# Patient Record
Sex: FEMALE | Race: WHITE | NOT HISPANIC OR LATINO | Employment: FULL TIME | URBAN - METROPOLITAN AREA
[De-identification: names, ages, dates, MRNs, and addresses within clinical notes are randomized per-mention and may not be internally consistent; named-entity substitution may affect disease eponyms.]

---

## 2023-02-15 PROBLEM — Z90.710 S/P ABDOMINAL HYSTERECTOMY AND LEFT SALPINGO-OOPHORECTOMY: Status: ACTIVE | Noted: 2023-02-15

## 2023-02-15 PROBLEM — M16.12 PRIMARY OSTEOARTHRITIS OF LEFT HIP: Status: ACTIVE | Noted: 2023-02-15

## 2023-02-15 PROBLEM — F17.210 CIGARETTE SMOKER: Status: ACTIVE | Noted: 2023-02-15

## 2023-02-15 PROBLEM — M54.50 LOW BACK PAIN: Status: ACTIVE | Noted: 2023-02-15

## 2023-02-15 PROBLEM — N39.0 ACUTE UTI: Status: ACTIVE | Noted: 2023-02-15

## 2023-02-15 PROBLEM — J45.909 ASTHMA (HHS-HCC): Status: ACTIVE | Noted: 2023-02-15

## 2023-02-15 PROBLEM — L30.9 ECZEMA: Status: ACTIVE | Noted: 2023-02-15

## 2023-02-15 PROBLEM — E66.01 OBESITY, MORBID (MORE THAN 100 LBS OVER IDEAL WEIGHT OR BMI > 40) (MULTI): Status: ACTIVE | Noted: 2023-02-15

## 2023-02-15 PROBLEM — H66.91 RIGHT OTITIS MEDIA: Status: ACTIVE | Noted: 2023-02-15

## 2023-02-15 PROBLEM — G47.33 OBSTRUCTIVE SLEEP APNEA: Status: ACTIVE | Noted: 2023-02-15

## 2023-02-15 PROBLEM — R60.0 BILATERAL EDEMA OF LOWER EXTREMITY: Status: ACTIVE | Noted: 2023-02-15

## 2023-02-15 PROBLEM — H66.92 LEFT OTITIS MEDIA: Status: ACTIVE | Noted: 2023-02-15

## 2023-02-15 PROBLEM — E03.9 HYPOTHYROIDISM IN ADULT: Status: ACTIVE | Noted: 2023-02-15

## 2023-02-15 PROBLEM — K04.7 INFECTION OF TOOTH: Status: ACTIVE | Noted: 2023-02-15

## 2023-02-15 PROBLEM — M54.2 ACUTE NECK PAIN: Status: ACTIVE | Noted: 2023-02-15

## 2023-02-15 PROBLEM — E55.9 VITAMIN D INSUFFICIENCY: Status: ACTIVE | Noted: 2023-02-15

## 2023-02-15 PROBLEM — L98.9 CRACKING SKIN: Status: ACTIVE | Noted: 2023-02-15

## 2023-02-15 PROBLEM — Q60.0 SOLITARY KIDNEY, CONGENITAL: Status: ACTIVE | Noted: 2023-02-15

## 2023-02-15 PROBLEM — J01.00 ACUTE MAXILLARY SINUSITIS: Status: ACTIVE | Noted: 2023-02-15

## 2023-02-15 PROBLEM — M54.32 CHRONIC SCIATICA OF LEFT SIDE: Status: ACTIVE | Noted: 2023-02-15

## 2023-02-15 PROBLEM — E11.9 NEW ONSET TYPE 2 DIABETES MELLITUS (MULTI): Status: ACTIVE | Noted: 2023-02-15

## 2023-02-15 PROBLEM — M79.89 LEG SWELLING: Status: ACTIVE | Noted: 2023-02-15

## 2023-02-15 PROBLEM — N91.2 AMENORRHEA: Status: ACTIVE | Noted: 2023-02-15

## 2023-02-15 PROBLEM — M25.561 RIGHT KNEE PAIN: Status: ACTIVE | Noted: 2023-02-15

## 2023-02-15 PROBLEM — Z90.721 S/P ABDOMINAL HYSTERECTOMY AND LEFT SALPINGO-OOPHORECTOMY: Status: ACTIVE | Noted: 2023-02-15

## 2023-02-15 PROBLEM — E78.2 MIXED HYPERLIPIDEMIA: Status: ACTIVE | Noted: 2023-02-15

## 2023-02-15 PROBLEM — K42.9 UMBILICAL HERNIA WITHOUT OBSTRUCTION AND WITHOUT GANGRENE: Status: ACTIVE | Noted: 2023-02-15

## 2023-02-15 PROBLEM — Z90.79 S/P ABDOMINAL HYSTERECTOMY AND LEFT SALPINGO-OOPHORECTOMY: Status: ACTIVE | Noted: 2023-02-15

## 2023-02-15 PROBLEM — M62.838 TRAPEZIUS MUSCLE SPASM: Status: ACTIVE | Noted: 2023-02-15

## 2023-02-15 PROBLEM — E11.65 POORLY CONTROLLED TYPE 2 DIABETES MELLITUS (MULTI): Status: ACTIVE | Noted: 2023-02-15

## 2023-02-15 PROBLEM — F41.9 ANXIETY: Status: ACTIVE | Noted: 2023-02-15

## 2023-02-15 PROBLEM — G56.03 CARPAL TUNNEL SYNDROME, BILATERAL UPPER LIMBS: Status: ACTIVE | Noted: 2023-02-15

## 2023-02-15 PROBLEM — F32.A DEPRESSIVE DISORDER: Status: ACTIVE | Noted: 2023-02-15

## 2023-02-15 PROBLEM — R00.2 PALPITATIONS: Status: ACTIVE | Noted: 2023-02-15

## 2023-02-15 PROBLEM — S81.809A WOUND, OPEN, LEG: Status: ACTIVE | Noted: 2023-02-15

## 2023-02-15 PROBLEM — R68.82 LOSS OF LIBIDO: Status: ACTIVE | Noted: 2023-02-15

## 2023-02-15 PROBLEM — K43.2 INCISIONAL HERNIA, WITHOUT OBSTRUCTION OR GANGRENE: Status: ACTIVE | Noted: 2023-02-15

## 2023-02-15 PROBLEM — E53.8 VITAMIN B12 DEFICIENCY: Status: ACTIVE | Noted: 2023-02-15

## 2023-02-15 RX ORDER — MELATONIN 10 MG
2 CAPSULE ORAL NIGHTLY
COMMUNITY
End: 2023-09-25 | Stop reason: SDUPTHER

## 2023-02-15 RX ORDER — BUPROPION HYDROCHLORIDE 150 MG/1
1 TABLET, EXTENDED RELEASE ORAL EVERY 12 HOURS
COMMUNITY
Start: 2019-12-11 | End: 2023-06-29

## 2023-02-15 RX ORDER — ICOSAPENT ETHYL 1 G/1
2 CAPSULE ORAL 2 TIMES DAILY
COMMUNITY
End: 2023-06-14

## 2023-02-15 RX ORDER — ACETAMINOPHEN, DEXTROMETHORPHAN HBR, DOXYLAMINE SUCCINATE, PHENYLEPHRINE HCL 650; 20; 12.5; 1 MG/30ML; MG/30ML; MG/30ML; MG/30ML
1 SOLUTION ORAL DAILY
COMMUNITY
Start: 2022-03-03 | End: 2023-06-14 | Stop reason: SDUPTHER

## 2023-02-15 RX ORDER — CHOLECALCIFEROL (VITAMIN D3) 25 MCG
2 TABLET ORAL DAILY
COMMUNITY
End: 2023-09-25 | Stop reason: SDUPTHER

## 2023-02-15 RX ORDER — DEXTROMETHORPHAN HYDROBROMIDE, GUAIFENESIN 5; 100 MG/5ML; MG/5ML
LIQUID ORAL AS NEEDED
COMMUNITY
End: 2023-09-25 | Stop reason: SDUPTHER

## 2023-02-15 RX ORDER — LANCETS 33 GAUGE
EACH MISCELLANEOUS
COMMUNITY
End: 2023-09-25 | Stop reason: SDUPTHER

## 2023-02-15 RX ORDER — LEVOTHYROXINE SODIUM 75 UG/1
1 TABLET ORAL DAILY
COMMUNITY
End: 2023-09-25 | Stop reason: SDUPTHER

## 2023-02-15 RX ORDER — BLOOD-GLUCOSE METER
EACH MISCELLANEOUS
COMMUNITY
Start: 2019-10-07 | End: 2023-09-25 | Stop reason: SDUPTHER

## 2023-02-15 RX ORDER — CYCLOBENZAPRINE HCL 10 MG
1 TABLET ORAL EVERY 8 HOURS PRN
COMMUNITY
Start: 2022-07-22 | End: 2023-09-25 | Stop reason: SDUPTHER

## 2023-02-15 RX ORDER — GABAPENTIN 300 MG/1
1 CAPSULE ORAL NIGHTLY
COMMUNITY
Start: 2022-03-07 | End: 2023-09-25 | Stop reason: SDUPTHER

## 2023-02-15 RX ORDER — FENOFIBRATE 160 MG/1
1 TABLET ORAL DAILY
COMMUNITY
Start: 2020-04-22 | End: 2023-03-28 | Stop reason: SDUPTHER

## 2023-02-15 RX ORDER — DULOXETIN HYDROCHLORIDE 60 MG/1
1 CAPSULE, DELAYED RELEASE ORAL DAILY
COMMUNITY
End: 2023-04-20 | Stop reason: SDUPTHER

## 2023-02-15 RX ORDER — HYDROXYZINE HYDROCHLORIDE 25 MG/1
1 TABLET, FILM COATED ORAL 3 TIMES DAILY PRN
COMMUNITY
Start: 2022-08-03 | End: 2023-08-11 | Stop reason: SDUPTHER

## 2023-02-15 RX ORDER — ATORVASTATIN CALCIUM 20 MG/1
1 TABLET, FILM COATED ORAL DAILY
COMMUNITY
Start: 2019-12-11 | End: 2023-09-25 | Stop reason: SDUPTHER

## 2023-02-15 RX ORDER — METFORMIN HYDROCHLORIDE 500 MG/1
2 TABLET, EXTENDED RELEASE ORAL
COMMUNITY
Start: 2019-10-07 | End: 2023-04-17

## 2023-02-15 RX ORDER — PEN NEEDLE, DIABETIC 30 GX3/16"
NEEDLE, DISPOSABLE MISCELLANEOUS
COMMUNITY
Start: 2022-03-07 | End: 2024-03-18 | Stop reason: WASHOUT

## 2023-02-15 RX ORDER — SPIRONOLACTONE 25 MG/1
0.5 TABLET ORAL DAILY
COMMUNITY
End: 2023-09-11 | Stop reason: SDUPTHER

## 2023-03-24 PROBLEM — M25.569 KNEE PAIN: Status: ACTIVE | Noted: 2023-03-24

## 2023-03-24 PROBLEM — M79.89 SYMPTOM OF LEG SWELLING: Status: ACTIVE | Noted: 2023-03-24

## 2023-03-24 PROBLEM — G56.03 BILATERAL CARPAL TUNNEL SYNDROME: Status: ACTIVE | Noted: 2023-03-24

## 2023-03-27 ENCOUNTER — TELEPHONE (OUTPATIENT)
Dept: PRIMARY CARE | Facility: CLINIC | Age: 38
End: 2023-03-27
Payer: COMMERCIAL

## 2023-03-27 LAB
ALANINE AMINOTRANSFERASE (SGPT) (U/L) IN SER/PLAS: 31 U/L (ref 7–45)
ALBUMIN (G/DL) IN SER/PLAS: 4.1 G/DL (ref 3.4–5)
ALBUMIN (MG/L) IN URINE: 45 MG/L
ALBUMIN/CREATININE (UG/MG) IN URINE: 71.7 UG/MG CRT (ref 0–30)
ALKALINE PHOSPHATASE (U/L) IN SER/PLAS: 48 U/L (ref 33–110)
ANION GAP IN SER/PLAS: 11 MMOL/L (ref 10–20)
ASPARTATE AMINOTRANSFERASE (SGOT) (U/L) IN SER/PLAS: 31 U/L (ref 9–39)
BILIRUBIN TOTAL (MG/DL) IN SER/PLAS: 0.7 MG/DL (ref 0–1.2)
CALCIDIOL (25 OH VITAMIN D3) (NG/ML) IN SER/PLAS: 25 NG/ML
CALCIUM (MG/DL) IN SER/PLAS: 9.3 MG/DL (ref 8.6–10.3)
CARBON DIOXIDE, TOTAL (MMOL/L) IN SER/PLAS: 27 MMOL/L (ref 21–32)
CHLORIDE (MMOL/L) IN SER/PLAS: 105 MMOL/L (ref 98–107)
CHOLESTEROL (MG/DL) IN SER/PLAS: 220 MG/DL (ref 0–199)
CHOLESTEROL IN HDL (MG/DL) IN SER/PLAS: 33.7 MG/DL
CHOLESTEROL/HDL RATIO: 6.5
COBALAMIN (VITAMIN B12) (PG/ML) IN SER/PLAS: 357 PG/ML (ref 211–911)
CREATININE (MG/DL) IN SER/PLAS: 0.67 MG/DL (ref 0.5–1.05)
CREATININE (MG/DL) IN URINE: 62.8 MG/DL (ref 20–320)
GFR FEMALE: >90 ML/MIN/1.73M2
GLUCOSE (MG/DL) IN SER/PLAS: 103 MG/DL (ref 74–99)
LDL: 125 MG/DL (ref 0–99)
NON HDL CHOLESTEROL: 186 MG/DL
POTASSIUM (MMOL/L) IN SER/PLAS: 3.9 MMOL/L (ref 3.5–5.3)
PROTEIN TOTAL: 7.2 G/DL (ref 6.4–8.2)
SODIUM (MMOL/L) IN SER/PLAS: 139 MMOL/L (ref 136–145)
THYROTROPIN (MIU/L) IN SER/PLAS BY DETECTION LIMIT <= 0.05 MIU/L: 2.25 MIU/L (ref 0.44–3.98)
TRIGLYCERIDE (MG/DL) IN SER/PLAS: 307 MG/DL (ref 0–149)
UREA NITROGEN (MG/DL) IN SER/PLAS: 11 MG/DL (ref 6–23)
VLDL: 61 MG/DL (ref 0–40)

## 2023-03-27 NOTE — TELEPHONE ENCOUNTER
Pt came into office stated her  passed on Saturday and wants to know if anything can be called in? Pt cannot sleep.     Pt pharmacy [facility]

## 2023-03-28 ENCOUNTER — OFFICE VISIT (OUTPATIENT)
Dept: PRIMARY CARE | Facility: CLINIC | Age: 38
End: 2023-03-28
Payer: COMMERCIAL

## 2023-03-28 VITALS
SYSTOLIC BLOOD PRESSURE: 126 MMHG | RESPIRATION RATE: 20 BRPM | TEMPERATURE: 97.1 F | DIASTOLIC BLOOD PRESSURE: 82 MMHG | HEIGHT: 60 IN | OXYGEN SATURATION: 95 % | HEART RATE: 112 BPM | BODY MASS INDEX: 57.52 KG/M2 | WEIGHT: 293 LBS

## 2023-03-28 DIAGNOSIS — E78.2 MIXED HYPERLIPIDEMIA: ICD-10-CM

## 2023-03-28 DIAGNOSIS — N91.2 AMENORRHEA: ICD-10-CM

## 2023-03-28 DIAGNOSIS — R60.0 BILATERAL EDEMA OF LOWER EXTREMITY: ICD-10-CM

## 2023-03-28 DIAGNOSIS — F32.A DEPRESSIVE DISORDER: ICD-10-CM

## 2023-03-28 DIAGNOSIS — F41.9 ANXIETY: ICD-10-CM

## 2023-03-28 DIAGNOSIS — E03.9 HYPOTHYROIDISM IN ADULT: ICD-10-CM

## 2023-03-28 DIAGNOSIS — F17.210 CIGARETTE SMOKER: ICD-10-CM

## 2023-03-28 DIAGNOSIS — E66.01 OBESITY, MORBID (MORE THAN 100 LBS OVER IDEAL WEIGHT OR BMI > 40) (MULTI): ICD-10-CM

## 2023-03-28 DIAGNOSIS — E11.65 POORLY CONTROLLED TYPE 2 DIABETES MELLITUS (MULTI): ICD-10-CM

## 2023-03-28 DIAGNOSIS — E55.9 VITAMIN D INSUFFICIENCY: ICD-10-CM

## 2023-03-28 DIAGNOSIS — G47.33 OSA (OBSTRUCTIVE SLEEP APNEA): ICD-10-CM

## 2023-03-28 DIAGNOSIS — G56.03 CARPAL TUNNEL SYNDROME, BILATERAL UPPER LIMBS: Primary | ICD-10-CM

## 2023-03-28 PROBLEM — R10.9 ABDOMINAL PAIN: Status: ACTIVE | Noted: 2017-06-05

## 2023-03-28 PROBLEM — J96.01 ACUTE RESPIRATORY FAILURE WITH HYPOXIA (MULTI): Status: ACTIVE | Noted: 2017-06-05

## 2023-03-28 PROBLEM — N83.512 TORSION OF LEFT OVARY AND OVARIAN PEDICLE: Status: ACTIVE | Noted: 2017-06-04

## 2023-03-28 PROBLEM — R00.0 TACHYCARDIA: Status: ACTIVE | Noted: 2017-06-04

## 2023-03-28 PROBLEM — E66.9 OBESITY: Status: ACTIVE | Noted: 2017-06-04

## 2023-03-28 PROBLEM — R09.02 HYPOXIA: Status: ACTIVE | Noted: 2017-06-04

## 2023-03-28 PROBLEM — J18.9 HCAP (HEALTHCARE-ASSOCIATED PNEUMONIA): Status: ACTIVE | Noted: 2017-06-07

## 2023-03-28 PROBLEM — E87.6 HYPOKALEMIA: Status: ACTIVE | Noted: 2017-06-05

## 2023-03-28 PROBLEM — A41.9 SEPSIS (MULTI): Status: ACTIVE | Noted: 2017-06-05

## 2023-03-28 LAB
ESTIMATED AVERAGE GLUCOSE FOR HBA1C: 146 MG/DL
HEMOGLOBIN A1C/HEMOGLOBIN TOTAL IN BLOOD: 6.7 %

## 2023-03-28 PROCEDURE — 99214 OFFICE O/P EST MOD 30 MIN: CPT | Performed by: INTERNAL MEDICINE

## 2023-03-28 PROCEDURE — 3074F SYST BP LT 130 MM HG: CPT | Performed by: INTERNAL MEDICINE

## 2023-03-28 PROCEDURE — 3079F DIAST BP 80-89 MM HG: CPT | Performed by: INTERNAL MEDICINE

## 2023-03-28 PROCEDURE — 3044F HG A1C LEVEL LT 7.0%: CPT | Performed by: INTERNAL MEDICINE

## 2023-03-28 RX ORDER — LORAZEPAM 0.5 MG/1
0.5 TABLET ORAL 3 TIMES DAILY PRN
Qty: 21 TABLET | Refills: 0 | Status: SHIPPED | OUTPATIENT
Start: 2023-03-28 | End: 2023-06-14 | Stop reason: ALTCHOICE

## 2023-03-28 RX ORDER — FENOFIBRATE 160 MG/1
160 TABLET ORAL DAILY
Qty: 90 TABLET | Refills: 1 | Status: SHIPPED | OUTPATIENT
Start: 2023-03-28 | End: 2023-09-25 | Stop reason: SDUPTHER

## 2023-03-28 SDOH — ECONOMIC STABILITY: FOOD INSECURITY: WITHIN THE PAST 12 MONTHS, THE FOOD YOU BOUGHT JUST DIDN'T LAST AND YOU DIDN'T HAVE MONEY TO GET MORE.: NEVER TRUE

## 2023-03-28 SDOH — ECONOMIC STABILITY: FOOD INSECURITY: WITHIN THE PAST 12 MONTHS, YOU WORRIED THAT YOUR FOOD WOULD RUN OUT BEFORE YOU GOT MONEY TO BUY MORE.: NEVER TRUE

## 2023-03-28 ASSESSMENT — ENCOUNTER SYMPTOMS
SLEEP DISTURBANCE: 1
ABDOMINAL DISTENTION: 0
CONSTIPATION: 0
PALPITATIONS: 0
OCCASIONAL FEELINGS OF UNSTEADINESS: 0
SHORTNESS OF BREATH: 0
DIFFICULTY URINATING: 0
DIARRHEA: 0
FEVER: 0
BACK PAIN: 0
COUGH: 0
FATIGUE: 0
JOINT SWELLING: 0
DYSURIA: 0
STRIDOR: 0
CONFUSION: 0
LOSS OF SENSATION IN FEET: 0
ABDOMINAL PAIN: 0
HEMATURIA: 0
NECK STIFFNESS: 0
HEADACHES: 0
MYALGIAS: 0
DYSPHORIC MOOD: 1
DIZZINESS: 0
NAUSEA: 0
WEAKNESS: 0
NERVOUS/ANXIOUS: 1
EYES NEGATIVE: 1
CHEST TIGHTNESS: 0
LIGHT-HEADEDNESS: 0
NECK PAIN: 0
UNEXPECTED WEIGHT CHANGE: 0
VOMITING: 0
ENDOCRINE NEGATIVE: 1
NUMBNESS: 0
CHILLS: 0
DEPRESSION: 1

## 2023-03-28 ASSESSMENT — LIFESTYLE VARIABLES
AUDIT-C TOTAL SCORE: 0
HOW MANY STANDARD DRINKS CONTAINING ALCOHOL DO YOU HAVE ON A TYPICAL DAY: PATIENT DOES NOT DRINK
SKIP TO QUESTIONS 9-10: 1
HOW OFTEN DO YOU HAVE SIX OR MORE DRINKS ON ONE OCCASION: NEVER
HOW OFTEN DO YOU HAVE A DRINK CONTAINING ALCOHOL: NEVER

## 2023-03-28 NOTE — PROGRESS NOTES
Subjective   Patient ID: Norah Marquez is a 37 y.o. female who presents for Follow-up (4 month follow-up), Anxiety ( passed away 03/25/23.), and Insomnia.    HPI   Patient is here for 4 month follow-up with blood work. Patient states no changes in health.    Patient's  passed unexpectedly 3 days ago. She has been experiencing increased anxiety, depression, and insomnia. Is asking for something to help her get through the next week.     morbid obesity: Has been conscious of diet and exercise. Has lost weight and subsequently, labs have shown improvement with overall health. Patient has been unable to afford/ get insurance to cover Victoza or Ozempic. Would like to discuss other options today.     DM type 2: Labs on 3/27 show A1c decreased to 6.7%. takes metformin and Jardiance. Has no issues with the medications. Is in need of refills.      hyperlipidemia/ marked hypertriglyceridemia: Labs rechecked due to increased risk of pancreatitis.TG level has decreased from over 500 to mid-300s. Has been taking Vacepa. Needs refills.      hypothyroidism: TSH remains stable. Has no issues with Synthroid.     vitamin d insufficiency: vitamin d is insufficient. Will discuss increasing dose.  B 12 deficiency: stable    Right hand numbness: Has seen specialist at [facility] for carpal tunnel testing and treatment. Will continue to follow with specialist.    Review of Systems   Constitutional:  Negative for chills, fatigue, fever and unexpected weight change.   HENT: Negative.     Eyes: Negative.    Respiratory:  Negative for cough, chest tightness, shortness of breath and stridor.    Cardiovascular:  Negative for chest pain, palpitations and leg swelling.   Gastrointestinal:  Negative for abdominal distention, abdominal pain, constipation, diarrhea, nausea and vomiting.   Endocrine: Negative.    Genitourinary:  Negative for difficulty urinating, dysuria, hematuria and urgency.   Musculoskeletal:  Negative for  back pain, joint swelling, myalgias, neck pain and neck stiffness.   Skin: Negative.    Neurological:  Negative for dizziness, syncope, weakness, light-headedness, numbness and headaches.   Psychiatric/Behavioral:  Positive for dysphoric mood and sleep disturbance. Negative for behavioral problems, confusion, self-injury and suicidal ideas. The patient is nervous/anxious.          has passed. Increased depression, anxiety, and insomnia.       Objective   /82 (BP Location: Right arm, Patient Position: Sitting, BP Cuff Size: Adult)   Pulse (!) 112   Temp 36.2 °C (97.1 °F)   Resp 20   Ht 1.524 m (5')   Wt 135 kg (298 lb)   SpO2 95%   BMI 58.20 kg/m²     Physical Exam  Vitals (accompanied by mother in law) reviewed.   Constitutional:       Appearance: Normal appearance. She is obese. She is not ill-appearing or toxic-appearing.   HENT:      Head: Normocephalic and atraumatic.      Nose: Nose normal.      Mouth/Throat:      Mouth: Mucous membranes are moist.   Eyes:      Extraocular Movements: Extraocular movements intact.      Conjunctiva/sclera: Conjunctivae normal.   Cardiovascular:      Rate and Rhythm: Normal rate and regular rhythm.      Pulses: Normal pulses.      Heart sounds: Normal heart sounds.   Pulmonary:      Effort: Pulmonary effort is normal.      Breath sounds: Normal breath sounds.   Chest:      Chest wall: No tenderness.   Abdominal:      General: Bowel sounds are normal.      Palpations: Abdomen is soft.      Tenderness: There is no abdominal tenderness. There is no guarding or rebound.   Musculoskeletal:         General: Normal range of motion.      Cervical back: Normal range of motion and neck supple. No rigidity or tenderness.   Lymphadenopathy:      Cervical: No cervical adenopathy.   Skin:     General: Skin is warm and dry.   Neurological:      General: No focal deficit present.      Mental Status: She is alert and oriented to person, place, and time.      Motor: No weakness.       Gait: Gait normal.      Deep Tendon Reflexes: Reflexes normal.   Psychiatric:         Mood and Affect: Mood normal.         Behavior: Behavior normal.         Thought Content: Thought content normal.         Judgment: Judgment normal.         Assessment/Plan   Problem List Items Addressed This Visit          Nervous    Carpal tunnel syndrome, bilateral upper limbs - Primary    DRISS (obstructive sleep apnea): continue with CPAP       Genitourinary    Amenorrhea       Musculoskeletal    Bilateral edema of lower extremity        Endocrine/Metabolic    Hypothyroidism in adult    Obesity, morbid (more than 100 lbs over ideal weight or BMI > 40) (CMS/[facility])    Vitamin D insufficiency    Poorly controlled type 2 diabetes mellitus (CMS/[facility]): glucose control has improved, she likes Jardiance, she has been doing better, no med changes now       Other    Anxiety: Patient 's  passed away 3 days ago, apparent sudden death , patient would like something for short term use, trial of lorazepam, will order a 5 day supply to comply with controlled med guidelines for short term use    Hyperlipidemia: taking atorvastatin, she never started Vascepa , she is doing better since taking the Jardiance for DM, no med change for now. Recheck labs in 4 months      Cigarette smoker: will not address today due to recent loss of     Follow up in 4-6 weeks to reassess the anxiety issues, continue  duloxetine and the bupropion also.

## 2023-04-17 DIAGNOSIS — E11.65 POORLY CONTROLLED TYPE 2 DIABETES MELLITUS (MULTI): Primary | ICD-10-CM

## 2023-04-17 RX ORDER — METFORMIN HYDROCHLORIDE 500 MG/1
TABLET, EXTENDED RELEASE ORAL
Qty: 180 TABLET | Refills: 0 | Status: SHIPPED | OUTPATIENT
Start: 2023-04-17 | End: 2023-08-16

## 2023-04-20 DIAGNOSIS — F32.A DEPRESSIVE DISORDER: Primary | ICD-10-CM

## 2023-04-20 RX ORDER — DULOXETIN HYDROCHLORIDE 60 MG/1
60 CAPSULE, DELAYED RELEASE ORAL DAILY
Qty: 90 CAPSULE | Refills: 3 | Status: SHIPPED | OUTPATIENT
Start: 2023-04-20 | End: 2023-09-25 | Stop reason: SDUPTHER

## 2023-04-25 ENCOUNTER — APPOINTMENT (OUTPATIENT)
Dept: PRIMARY CARE | Facility: CLINIC | Age: 38
End: 2023-04-25
Payer: COMMERCIAL

## 2023-04-25 ENCOUNTER — HOSPITAL ENCOUNTER (OUTPATIENT)
Dept: DATA CONVERSION | Facility: HOSPITAL | Age: 38
End: 2023-04-25
Attending: ORTHOPAEDIC SURGERY | Admitting: ORTHOPAEDIC SURGERY

## 2023-04-25 DIAGNOSIS — Z79.84 LONG TERM (CURRENT) USE OF ORAL HYPOGLYCEMIC DRUGS: ICD-10-CM

## 2023-04-25 DIAGNOSIS — E78.5 HYPERLIPIDEMIA, UNSPECIFIED: ICD-10-CM

## 2023-04-25 DIAGNOSIS — G56.01 CARPAL TUNNEL SYNDROME, RIGHT UPPER LIMB: ICD-10-CM

## 2023-04-25 DIAGNOSIS — I49.9 CARDIAC ARRHYTHMIA, UNSPECIFIED: ICD-10-CM

## 2023-04-25 DIAGNOSIS — E66.01 MORBID (SEVERE) OBESITY DUE TO EXCESS CALORIES (MULTI): ICD-10-CM

## 2023-04-25 DIAGNOSIS — Z90.721 ACQUIRED ABSENCE OF OVARIES, UNILATERAL: ICD-10-CM

## 2023-04-25 DIAGNOSIS — E11.42 TYPE 2 DIABETES MELLITUS WITH DIABETIC POLYNEUROPATHY (MULTI): ICD-10-CM

## 2023-04-25 DIAGNOSIS — J45.909 UNSPECIFIED ASTHMA, UNCOMPLICATED (HHS-HCC): ICD-10-CM

## 2023-04-25 DIAGNOSIS — G47.33 OBSTRUCTIVE SLEEP APNEA (ADULT) (PEDIATRIC): ICD-10-CM

## 2023-04-25 DIAGNOSIS — F32.A DEPRESSION, UNSPECIFIED: ICD-10-CM

## 2023-04-25 DIAGNOSIS — F41.9 ANXIETY DISORDER, UNSPECIFIED: ICD-10-CM

## 2023-04-25 DIAGNOSIS — F17.200 NICOTINE DEPENDENCE, UNSPECIFIED, UNCOMPLICATED: ICD-10-CM

## 2023-04-25 DIAGNOSIS — Q60.0 RENAL AGENESIS, UNILATERAL: ICD-10-CM

## 2023-04-25 DIAGNOSIS — I73.9 PERIPHERAL VASCULAR DISEASE, UNSPECIFIED (CMS-HCC): ICD-10-CM

## 2023-04-25 DIAGNOSIS — E03.9 HYPOTHYROIDISM, UNSPECIFIED: ICD-10-CM

## 2023-04-25 LAB
ATRIAL RATE: 86 BPM
P AXIS: 46 DEGREES
POCT GLUCOSE: 110 MG/DL (ref 74–99)
PR INTERVAL: 161 MS
Q ONSET: 251 MS
QRS COUNT: 13 BEATS
QRS DURATION: 88 MS
QT INTERVAL: 359 MS
QTC CALCULATION(BAZETT): 427 MS
QTC FREDERICIA: 403 MS
R AXIS: 87 DEGREES
T AXIS: 28 DEGREES
T OFFSET: 430 MS
VENTRICULAR RATE: 85 BPM

## 2023-05-02 LAB — HCG, URINE: NEGATIVE

## 2023-05-08 ENCOUNTER — PATIENT MESSAGE (OUTPATIENT)
Dept: PRIMARY CARE | Facility: CLINIC | Age: 38
End: 2023-05-08
Payer: COMMERCIAL

## 2023-05-08 DIAGNOSIS — G47.01 INSOMNIA DUE TO MEDICAL CONDITION: ICD-10-CM

## 2023-05-08 DIAGNOSIS — F32.A DEPRESSIVE DISORDER: Primary | ICD-10-CM

## 2023-05-08 DIAGNOSIS — G47.33 OSA (OBSTRUCTIVE SLEEP APNEA): ICD-10-CM

## 2023-05-09 RX ORDER — MIRTAZAPINE 15 MG/1
TABLET, FILM COATED ORAL
Qty: 60 TABLET | Refills: 2 | Status: SHIPPED | OUTPATIENT
Start: 2023-05-09 | End: 2023-08-30

## 2023-05-10 ENCOUNTER — TELEPHONE (OUTPATIENT)
Dept: PRIMARY CARE | Facility: CLINIC | Age: 38
End: 2023-05-10
Payer: COMMERCIAL

## 2023-05-10 NOTE — TELEPHONE ENCOUNTER
----- Message from Tal Piña MD sent at 5/9/2023  7:27 PM EDT -----  Regarding: FW: Sleep  Contact: [phone]  Ambien may be helpful short term, but is potentially addictive if used chronically. Has the patient been using CPAP, she does have DRISS? Will try mirtazapine instead of trazodone to see if is at all helpful, increase activity during the day also, avoid napping if possible.   ----- Message -----  From: Abraham Gallo MA  Sent: 5/8/2023   5:09 PM EDT  To: Tal Piña MD  Subject: FW: Sleep                                          ----- Message -----  From: Norah Marquez  Sent: 5/8/2023   2:19 PM EDT  To:  [place] [id number] Clinical Support Staff  Subject: Sleep                                            Hello, I was wondering if there’s anything besides trazadone to help me sleep? The trazadone isn’t effective. I’ve increased the dose to 2 and still not effective. I’m not sleeping well at night and it’s taking a long time to fall asleep. I thought you may have mentioned Ambien? Anything that you can please prescribe that may help? I’m going off of 4 nights of hardly any sleep to 1 night of a little bit of sleep. Thank you.

## 2023-05-10 NOTE — TELEPHONE ENCOUNTER
Patient notified. She will stop the trazodone and try the mirtazapine and let us know in a week or 2 if it is effective.

## 2023-06-07 ENCOUNTER — TELEPHONE (OUTPATIENT)
Dept: PRIMARY CARE | Facility: CLINIC | Age: 38
End: 2023-06-07
Payer: COMMERCIAL

## 2023-06-07 NOTE — TELEPHONE ENCOUNTER
How long have you been sick? ABOUT A WEEK  Cough (productive or nonproductive)? YES, PRODUCTIVE  Color of phlegm? CLEAR  Sinus pain? YES  Sinus drainage/color? YES  Earache? LEFT EAR PAIN/ DRAINAGE/ PRESSURE  Congestion? SOME  Headache? NO  Fever (if yes, temp?) NO  Sore throat? NO  Short of breath/wheezing? NO  OTC medication? DAYQUIL      PATIENT HAS NOT TAKEN A COVID TEST YET AND WAS ADVISED TO CALL US WITH THE RESULTS

## 2023-06-14 ENCOUNTER — OFFICE VISIT (OUTPATIENT)
Dept: PRIMARY CARE | Facility: CLINIC | Age: 38
End: 2023-06-14
Payer: COMMERCIAL

## 2023-06-14 VITALS
OXYGEN SATURATION: 99 % | WEIGHT: 245 LBS | TEMPERATURE: 96.7 F | BODY MASS INDEX: 48.1 KG/M2 | HEIGHT: 60 IN | SYSTOLIC BLOOD PRESSURE: 121 MMHG | HEART RATE: 75 BPM | DIASTOLIC BLOOD PRESSURE: 78 MMHG

## 2023-06-14 DIAGNOSIS — E03.9 HYPOTHYROIDISM IN ADULT: ICD-10-CM

## 2023-06-14 DIAGNOSIS — E11.9 NEW ONSET TYPE 2 DIABETES MELLITUS (MULTI): ICD-10-CM

## 2023-06-14 DIAGNOSIS — F32.A DEPRESSIVE DISORDER: ICD-10-CM

## 2023-06-14 DIAGNOSIS — E55.9 VITAMIN D INSUFFICIENCY: ICD-10-CM

## 2023-06-14 DIAGNOSIS — G56.03 BILATERAL CARPAL TUNNEL SYNDROME: Primary | ICD-10-CM

## 2023-06-14 DIAGNOSIS — E78.2 MIXED HYPERLIPIDEMIA: ICD-10-CM

## 2023-06-14 DIAGNOSIS — E53.8 VITAMIN B12 DEFICIENCY: ICD-10-CM

## 2023-06-14 PROCEDURE — 3074F SYST BP LT 130 MM HG: CPT | Performed by: INTERNAL MEDICINE

## 2023-06-14 PROCEDURE — 99214 OFFICE O/P EST MOD 30 MIN: CPT | Performed by: INTERNAL MEDICINE

## 2023-06-14 PROCEDURE — 3044F HG A1C LEVEL LT 7.0%: CPT | Performed by: INTERNAL MEDICINE

## 2023-06-14 PROCEDURE — 3078F DIAST BP <80 MM HG: CPT | Performed by: INTERNAL MEDICINE

## 2023-06-14 PROCEDURE — 3008F BODY MASS INDEX DOCD: CPT | Performed by: INTERNAL MEDICINE

## 2023-06-14 RX ORDER — BACLOFEN 20 MG
TABLET ORAL
COMMUNITY
End: 2024-03-18 | Stop reason: WASHOUT

## 2023-06-14 RX ORDER — ACETAMINOPHEN, DEXTROMETHORPHAN HBR, DOXYLAMINE SUCCINATE, PHENYLEPHRINE HCL 650; 20; 12.5; 1 MG/30ML; MG/30ML; MG/30ML; MG/30ML
1 SOLUTION ORAL DAILY
Qty: 90 TABLET | Refills: 3 | Status: SHIPPED | OUTPATIENT
Start: 2023-06-14 | End: 2023-09-25 | Stop reason: SDUPTHER

## 2023-06-14 RX ORDER — HYDROCODONE BITARTRATE AND ACETAMINOPHEN 5; 325 MG/1; MG/1
2 TABLET ORAL EVERY 4 HOURS PRN
COMMUNITY
Start: 2023-04-25 | End: 2023-12-27

## 2023-06-14 ASSESSMENT — PATIENT HEALTH QUESTIONNAIRE - PHQ9
SUM OF ALL RESPONSES TO PHQ9 QUESTIONS 1 & 2: 2
2. FEELING DOWN, DEPRESSED OR HOPELESS: SEVERAL DAYS
10. IF YOU CHECKED OFF ANY PROBLEMS, HOW DIFFICULT HAVE THESE PROBLEMS MADE IT FOR YOU TO DO YOUR WORK, TAKE CARE OF THINGS AT HOME, OR GET ALONG WITH OTHER PEOPLE: SOMEWHAT DIFFICULT
1. LITTLE INTEREST OR PLEASURE IN DOING THINGS: SEVERAL DAYS

## 2023-06-14 ASSESSMENT — PAIN SCALES - GENERAL: PAINLEVEL: 0-NO PAIN

## 2023-06-14 NOTE — PROGRESS NOTES
Chief Complaint/HPI:    Patient is here for 3 month follow-up .     Patient's  passed unexpectedly. She had been experiencing increased anxiety, she currently takes duloxetine, Wellbutrin, gabapentin. Patient feels good health wise, mirtazapine has helped a lot with sleep issues. Patient got a new job, she feels less stressed now.     DM type 2: patient takes Jardiance and metformin, patient admits to stress eating, she is more physically active now.    CTS: patient had right carpel tunnel surgery, right hand is less numb, she plans to have the left carpel tunnel surgery completed soon.     Hypothyroidism: patient takes levothyroxine daily    Hyperlipidemia: patient takes Lipitor and fenofibrate    Patient just returned from [place], she does feel lonely she states     ROS otherwise negative aside from what was mentioned above in HPI.      Patient Active Problem List   Diagnosis    Acute maxillary sinusitis    Acute neck pain    Acute UTI    Anxiety    Asthma    Bilateral edema of lower extremity    Chronic sciatica of left side    Cigarette smoker    Cracking skin    Depressive disorder    Eczema    Hypothyroidism in adult    Incisional hernia, without obstruction or gangrene    Infection of tooth    Loss of libido    Low back pain    Mixed hyperlipidemia    New onset type 2 diabetes mellitus (CMS/HCC)    Obesity, morbid (more than 100 lbs over ideal weight or BMI > 40) (CMS/HCC)    Obstructive sleep apnea    Palpitations    Primary osteoarthritis of left hip    Right knee pain    Left otitis media    Right otitis media    S/P abdominal hysterectomy and left salpingo-oophorectomy    Solitary kidney, congenital    Trapezius muscle spasm    Umbilical hernia without obstruction and without gangrene    Vitamin B12 deficiency    Vitamin D insufficiency    Wound, open, leg    Leg swelling    Amenorrhea    Carpal tunnel syndrome, bilateral upper limbs    Poorly controlled type 2 diabetes mellitus (CMS/HCC)    Knee  pain    Symptom of leg swelling    Bilateral carpal tunnel syndrome    Abdominal pain    Acute respiratory failure with hypoxia (CMS/HCC)    HCAP (healthcare-associated pneumonia)    Hypokalemia    Sepsis (CMS/HCC)    Tachycardia    Torsion of left ovary and ovarian pedicle    Hypoxia    Obesity    DRISS (obstructive sleep apnea)         Past Medical History:   Diagnosis Date    Developmental ovarian cyst     Multiple developmental ovarian cysts    Personal history of other diseases of the musculoskeletal system and connective tissue     History of arthritis    Personal history of other diseases of the musculoskeletal system and connective tissue     History of chronic back pain    Personal history of other diseases of the respiratory system     History of bronchitis    Personal history of other endocrine, nutritional and metabolic disease     History of morbid obesity    Personal history of pneumonia (recurrent)     History of pneumonia     Past Surgical History:   Procedure Laterality Date    OTHER SURGICAL HISTORY  09/10/2019    Laparoscopy    OTHER SURGICAL HISTORY  09/10/2019    Oophorectomy    OTHER SURGICAL HISTORY  12/11/2019    Umbilical hernia repair     Social History     Social History Narrative    Not on file         ALLERGIES  Patient has no known allergies.      MEDICATIONS  Current Outpatient Medications on File Prior to Visit   Medication Sig Dispense Refill    acetaminophen (Tylenol 8 Hour) 650 mg ER tablet Take by mouth if needed. Take tablet prn      atorvastatin (Lipitor) 20 mg tablet Take 1 tablet (20 mg) by mouth once daily.      blood sugar diagnostic (OneTouch Verio test strips) strip Test once every day      buPROPion SR (Wellbutrin SR) 150 mg 12 hr tablet Take 1 tablet (150 mg) by mouth every 12 hours.      cholecalciferol (Vitamin D-3) 25 MCG (1000 UT) tablet Take 2 tablets (50 mcg) by mouth once daily.      DULoxetine (Cymbalta) 60 mg DR capsule Take 1 capsule (60 mg) by mouth once daily.  "90 capsule 3    empagliflozin (Jardiance) 25 mg Take 1 tablet (25 mg) by mouth once daily. Take 1 tablet daily, start in 2 weeks      fenofibrate (Triglide) 160 mg tablet Take 1 tablet (160 mg) by mouth once daily. 90 tablet 1    gabapentin (Neurontin) 300 mg capsule Take 1 capsule (300 mg) by mouth once daily at bedtime.      lancets 33 gauge misc Use as directed      levothyroxine (Synthroid, Levoxyl) 75 mcg tablet Take 1 tablet (75 mcg) by mouth once daily. As directed      melatonin 10 mg capsule Take 2 capsules (20 mg) by mouth once daily at bedtime.      metFORMIN XR (Glucophage-XR) 500 mg 24 hr tablet TAKE TWO TABLETS BY MOUTH EVERY  tablet 0    mirtazapine (Remeron) 15 mg tablet Take 1 nightly at bedtime, take 2 nightly if one tab is not effective 60 tablet 2    pen needle, diabetic 31 gauge x 5/16\" needle Use one daily to administer Victoza      spironolactone (Aldactone) 25 mg tablet Take 0.5 tablets (12.5 mg) by mouth once daily.      [DISCONTINUED] cyanocobalamin, vitamin B-12, (Vitamin B-12) 1,000 mcg tablet extended release Take 1,000 mcg by mouth once daily. As directed      cyclobenzaprine (Flexeril) 10 mg tablet Take 1 tablet (10 mg) by mouth every 8 hours if needed.      HYDROcodone-acetaminophen (Norco) 5-325 mg tablet Take 2 tablets by mouth every 4 hours if needed.      hydrOXYzine HCL (Atarax) 25 mg tablet Take 1 tablet (25 mg) by mouth 3 times a day as needed.      magnesium oxide 500 mg tablet magnesium oxide 500 mg tablet   Take by oral route.      [DISCONTINUED] empagliflozin (Jardiance) 10 mg Take 1 tablet (10 mg) by mouth once daily.      [DISCONTINUED] icosapent ethyL (Vascepa) 1 gram capsule Take 2 capsules (2 g) by mouth 2 times a day. TG level is now over 500, despite use of maximum dose of fenofibrate      [DISCONTINUED] LORazepam (Ativan) 0.5 mg tablet Take 1 tablet (0.5 mg) by mouth 3 times a day as needed for anxiety for up to 7 days. 21 tablet 0    [DISCONTINUED] traZODone " (Desyrel) 50 mg tablet Take 1 tablet (50 mg) by mouth once daily at bedtime. May increase dose to two tabs nightly if one tab is not effective for sleep (Patient not taking: Reported on 6/14/2023) 30 tablet 5     No current facility-administered medications on file prior to visit.         PHYSICAL EXAM  /78 (BP Location: Right arm, Patient Position: Sitting, BP Cuff Size: Adult)   Pulse 75   Temp 35.9 °C (96.7 °F) (Temporal)   Ht 1.524 m (5')   Wt 111 kg (245 lb)   SpO2 99%   BMI 47.85 kg/m²   Body mass index is 47.85 kg/m².  Gen: Alert, NAD,she is morbidly obese  HEENT:  EOMI, conjunctiva and sclera normal in appearance, no thyromegaly or masses, no carotid bruits  Respiratory:  Lungs CTAB  Cardiovascular:  Heart RRR. No M/R/G, no peripheral edema is noted, a few varicosities are noted on the lower extremities  Neuro:  Gross motor and sensory intact  Skin:  No suspicious lesions present on exposed skin    ASSESSMENT/PLAN  Problem List Items Addressed This Visit       Depressive disorder    Current Assessment & Plan     Stable on current med therapies, mirtazapine does help with sleep issues         Relevant Orders    TSH with reflex to Free T4 if abnormal    Comprehensive Metabolic Panel    Hypothyroidism in adult    Current Assessment & Plan     Check labs prior to next visit, no med changes         Relevant Orders    Comprehensive Metabolic Panel    Mixed hyperlipidemia    Current Assessment & Plan     Continue fenofibrate and statin therapy, recheck labs prior to next visit         Relevant Orders    Comprehensive Metabolic Panel    Lipid panel    New onset type 2 diabetes mellitus (CMS/HCC)    Current Assessment & Plan     Continue metformin and Jardiance, check labs prior to next visit          Relevant Orders    Hemoglobin A1C    Albumin , Urine Random    Comprehensive Metabolic Panel    Vitamin B12 deficiency    Current Assessment & Plan     Check labs         Relevant Medications     cyanocobalamin, vitamin B-12, (Vitamin B-12) 1,000 mcg tablet extended release    Other Relevant Orders    Comprehensive Metabolic Panel    Vitamin D insufficiency    Current Assessment & Plan     Check labs         Relevant Orders    Vitamin D 1,25 Dihydroxy    Comprehensive Metabolic Panel    Bilateral carpal tunnel syndrome - Primary    Current Assessment & Plan     Had carpel tunnel release x 1, will have the left wrist completed also         Relevant Orders    Comprehensive Metabolic Panel     Follow up in 3 months, check labs prior to visit    Tal Piña MD

## 2023-06-26 DIAGNOSIS — F41.9 ANXIETY: Primary | ICD-10-CM

## 2023-06-29 RX ORDER — PIOGLITAZONEHYDROCHLORIDE 15 MG/1
1 TABLET ORAL DAILY
COMMUNITY
Start: 2022-08-17 | End: 2023-09-25 | Stop reason: ALTCHOICE

## 2023-06-29 RX ORDER — BUPROPION HYDROCHLORIDE 150 MG/1
TABLET, EXTENDED RELEASE ORAL
Qty: 180 TABLET | Refills: 1 | Status: SHIPPED | OUTPATIENT
Start: 2023-06-29 | End: 2023-09-25 | Stop reason: SDUPTHER

## 2023-06-29 RX ORDER — FUROSEMIDE 20 MG/1
20 TABLET ORAL DAILY PRN
COMMUNITY
Start: 2022-11-22 | End: 2023-09-25 | Stop reason: SDUPTHER

## 2023-08-10 ENCOUNTER — TELEPHONE (OUTPATIENT)
Dept: PRIMARY CARE | Facility: CLINIC | Age: 38
End: 2023-08-10
Payer: COMMERCIAL

## 2023-08-10 DIAGNOSIS — F41.9 ANXIETY: Primary | ICD-10-CM

## 2023-08-10 NOTE — TELEPHONE ENCOUNTER
Patient is calling for a script for hydrOXYzine HCL (Atarax) 25 mg tablet.  Script was started by Dr Marcial.    Pharmacy is [facility] in [place].

## 2023-08-11 RX ORDER — HYDROXYZINE HYDROCHLORIDE 25 MG/1
25 TABLET, FILM COATED ORAL 3 TIMES DAILY PRN
Qty: 90 TABLET | Refills: 1 | Status: SHIPPED | OUTPATIENT
Start: 2023-08-11 | End: 2023-09-25 | Stop reason: SDUPTHER

## 2023-08-13 DIAGNOSIS — E11.65 POORLY CONTROLLED TYPE 2 DIABETES MELLITUS (MULTI): ICD-10-CM

## 2023-08-16 RX ORDER — METFORMIN HYDROCHLORIDE 500 MG/1
1000 TABLET, EXTENDED RELEASE ORAL DAILY
Qty: 60 TABLET | Refills: 0 | Status: SHIPPED | OUTPATIENT
Start: 2023-08-16 | End: 2023-09-19

## 2023-08-28 DIAGNOSIS — G47.33 OSA (OBSTRUCTIVE SLEEP APNEA): ICD-10-CM

## 2023-08-28 DIAGNOSIS — F32.A DEPRESSIVE DISORDER: ICD-10-CM

## 2023-08-28 DIAGNOSIS — G47.01 INSOMNIA DUE TO MEDICAL CONDITION: ICD-10-CM

## 2023-08-30 RX ORDER — MIRTAZAPINE 15 MG/1
TABLET, FILM COATED ORAL
Qty: 60 TABLET | Refills: 0 | Status: SHIPPED | OUTPATIENT
Start: 2023-08-30 | End: 2023-09-25 | Stop reason: SDUPTHER

## 2023-09-07 VITALS — BODY MASS INDEX: 47.18 KG/M2 | WEIGHT: 240.3 LBS | HEIGHT: 60 IN

## 2023-09-11 DIAGNOSIS — M79.89 LEG SWELLING: Primary | ICD-10-CM

## 2023-09-11 RX ORDER — SPIRONOLACTONE 25 MG/1
12.5 TABLET ORAL DAILY
Qty: 30 TABLET | Refills: 11 | Status: SHIPPED | OUTPATIENT
Start: 2023-09-11 | End: 2023-09-25 | Stop reason: SDUPTHER

## 2023-09-14 NOTE — H&P
"    History & Physical Reviewed:   Pregnant/Lactating:  ·  Are You Pregnant no (1)   ·  Are You Currently Breastfeeding no (1)     I have reviewed the History and Physical dated:  13-Apr-2023   History and Physical reviewed and relevant findings noted. Patient examined to review pertinent physical  findings.: No significant changes   Home Medications Reviewed: no changes noted   Allergies Reviewed: no changes noted       ERAS (Enhanced Recovery After Surgery):  ·  ERAS Patient: no     Consent:   COVID-19 Consent:  ·  COVID-19 Risk Consent Surgeon has reviewed key risks related to the risk of margarita COVID-19 and if they contract COVID-19 what the risks are.       Electronic Signatures:  ERIK Aranda James ()  (Signed 25-Apr-2023 08:06)   Authored: History & Physical Reviewed, ERAS, Consent,  Note Completion      Last Updated: 25-Apr-2023 08:06 by ERIK Aranda James ()    References:  1.  Data Referenced From \"Patient Profile - Preop v3\" 25-Apr-2023 07:44   "

## 2023-09-14 NOTE — PROGRESS NOTES
Service: Orthopaedics     Subjective Data:   WALLY MAURICE is a 37 year old Female who is Hospital Day # 1.    Objective Data:     Objective Information:    ORTHOPEDIC OPERATIVE NOTE    Name: Wally Maurice  : 87  Surgeon: Jet Aranda DO  Facility: [facility]  Date of Surgery: 23     SURGEON:     Jet Aranda DO  PRE OP DIAGNOSIS:  Carpal Tunnel Syndrome right Wrist  POST OP DIAGNOSIS:  Same  PROCEDURE:   Endoscopic Carpal Tunnel Release right Wrist    ANESTHESIA:  Local with sedation  ASSISTANT:    SA Borden  COMPLICATIONS:   None.  CONDITION:    Satisfactory to PACU.  BLOOD LOSS: Minimal    INDICATION FOR PROCEDURE: The patient is a 37-year-old female who has failed nonsurgical management for right carpal tunnel syndrome. The patient was seen in the office, fully informed risks and benefits of the procedure, and elected to undergo the procedure.    PROCEDURE IN DETAIL: The patient was seen and consented preoperatively with side and site of surgery appropriately marked. The patient was taken back to the operative suite, placed supine on the operative table, and placed on monitor for the duration  of case. The patient was administered sedation and monitored throughout the entire surgery by Department of Anesthesia. While sedated, the patient was administered 5 cc of mixed marcaine and lidocaine to the volar aspect of wrist and palm for local anesthesia.  The operative upper extremity was then sterilely prepped and draped in sterile orthopedic fashion, elevated with an Esmarch, and tourniquet inflated to 250 mmHg for the duration of case, and time-out was performed confirming site of surgery and surgery  to be performed.    A 1-cm transverse incision was made to the ulnar aspect of the palmaris longus at proximal wrist crease. Skin and underlying tissues were sharply dissected down. Hemostasis maintained with bipolar electrocauterization. Distally based flap of the fascia  overlying  the median nerve was then released, and under direct visualization, proximal fascia was released with Littler scissors. The elevator and dilator were then placed in the carpal tunnel with appropriate ease of passage and corrugated feel of the  undersurface of transcarpal ligament. The endoscope was then placed under direct visualization. The transverse carpal ligament was released in its entirety, confirmed both visually as well as by utilizing endoscope as a probe, which freely passed following  release. The nerve was evaluated. No evidence of lesion or adhesion was present.    The wound was irrigated with sterile saline, closed with 5-0 nylon suture. Sterile dressing was then placed of Xeroform and 4x4s affixed with Kerlix and Ace wrap. Tourniquet was deflated, and the patient was taken to PACU in satisfactory condition.    Electronically signed  Jet Aranda DO     Assessment and Plan:   Code Status:  ·  Code Status Full Code       Electronic Signatures:  ERIK Aranda James ()  (Signed 25-Apr-2023 10:36)   Authored: Service, Subjective Data, Objective Data, Assessment  and Plan, Note Completion      Last Updated: 25-Apr-2023 10:36 by ERIK Aranda James ()

## 2023-09-17 DIAGNOSIS — E11.65 POORLY CONTROLLED TYPE 2 DIABETES MELLITUS (MULTI): ICD-10-CM

## 2023-09-19 RX ORDER — METFORMIN HYDROCHLORIDE 500 MG/1
1000 TABLET, EXTENDED RELEASE ORAL DAILY
Qty: 180 TABLET | Refills: 1 | Status: SHIPPED | OUTPATIENT
Start: 2023-09-19 | End: 2023-09-25 | Stop reason: SDUPTHER

## 2023-09-20 ENCOUNTER — LAB (OUTPATIENT)
Dept: LAB | Facility: LAB | Age: 38
End: 2023-09-20
Payer: COMMERCIAL

## 2023-09-20 DIAGNOSIS — E53.8 VITAMIN B12 DEFICIENCY: ICD-10-CM

## 2023-09-20 DIAGNOSIS — E03.9 HYPOTHYROIDISM IN ADULT: ICD-10-CM

## 2023-09-20 DIAGNOSIS — F32.A DEPRESSIVE DISORDER: ICD-10-CM

## 2023-09-20 DIAGNOSIS — E78.2 MIXED HYPERLIPIDEMIA: ICD-10-CM

## 2023-09-20 DIAGNOSIS — E55.9 VITAMIN D INSUFFICIENCY: ICD-10-CM

## 2023-09-20 DIAGNOSIS — E11.9 NEW ONSET TYPE 2 DIABETES MELLITUS (MULTI): ICD-10-CM

## 2023-09-20 DIAGNOSIS — G56.03 BILATERAL CARPAL TUNNEL SYNDROME: ICD-10-CM

## 2023-09-20 LAB
ALANINE AMINOTRANSFERASE (SGPT) (U/L) IN SER/PLAS: 22 U/L (ref 7–45)
ALBUMIN (G/DL) IN SER/PLAS: 4.3 G/DL (ref 3.4–5)
ALBUMIN (MG/L) IN URINE: 41.7 MG/L
ALBUMIN/CREATININE (UG/MG) IN URINE: 49.8 UG/MG CRT (ref 0–30)
ALKALINE PHOSPHATASE (U/L) IN SER/PLAS: 46 U/L (ref 33–110)
ANION GAP IN SER/PLAS: 14 MMOL/L (ref 10–20)
ASPARTATE AMINOTRANSFERASE (SGOT) (U/L) IN SER/PLAS: 18 U/L (ref 9–39)
BILIRUBIN TOTAL (MG/DL) IN SER/PLAS: 0.4 MG/DL (ref 0–1.2)
CALCIUM (MG/DL) IN SER/PLAS: 9.3 MG/DL (ref 8.6–10.3)
CARBON DIOXIDE, TOTAL (MMOL/L) IN SER/PLAS: 23 MMOL/L (ref 21–32)
CHLORIDE (MMOL/L) IN SER/PLAS: 108 MMOL/L (ref 98–107)
CHOLESTEROL (MG/DL) IN SER/PLAS: 176 MG/DL (ref 0–199)
CHOLESTEROL IN HDL (MG/DL) IN SER/PLAS: 32.7 MG/DL
CHOLESTEROL/HDL RATIO: 5.4
COBALAMIN (VITAMIN B12) (PG/ML) IN SER/PLAS: 450 PG/ML (ref 211–911)
CREATININE (MG/DL) IN SER/PLAS: 0.87 MG/DL (ref 0.5–1.05)
CREATININE (MG/DL) IN URINE: 83.7 MG/DL (ref 20–320)
ESTIMATED AVERAGE GLUCOSE FOR HBA1C: 131 MG/DL
GFR FEMALE: 88 ML/MIN/1.73M2
GLUCOSE (MG/DL) IN SER/PLAS: 107 MG/DL (ref 74–99)
HEMOGLOBIN A1C/HEMOGLOBIN TOTAL IN BLOOD: 6.2 %
LDL: 82 MG/DL (ref 0–99)
NON HDL CHOLESTEROL: 143 MG/DL
POTASSIUM (MMOL/L) IN SER/PLAS: 4 MMOL/L (ref 3.5–5.3)
PROTEIN TOTAL: 7 G/DL (ref 6.4–8.2)
SODIUM (MMOL/L) IN SER/PLAS: 141 MMOL/L (ref 136–145)
THYROTROPIN (MIU/L) IN SER/PLAS BY DETECTION LIMIT <= 0.05 MIU/L: 2.46 MIU/L (ref 0.44–3.98)
TRIGLYCERIDE (MG/DL) IN SER/PLAS: 306 MG/DL (ref 0–149)
UREA NITROGEN (MG/DL) IN SER/PLAS: 14 MG/DL (ref 6–23)
VLDL: 61 MG/DL (ref 0–40)

## 2023-09-20 PROCEDURE — 83036 HEMOGLOBIN GLYCOSYLATED A1C: CPT

## 2023-09-20 PROCEDURE — 82570 ASSAY OF URINE CREATININE: CPT

## 2023-09-20 PROCEDURE — 84443 ASSAY THYROID STIM HORMONE: CPT

## 2023-09-20 PROCEDURE — 80053 COMPREHEN METABOLIC PANEL: CPT

## 2023-09-20 PROCEDURE — 80061 LIPID PANEL: CPT

## 2023-09-20 PROCEDURE — 36415 COLL VENOUS BLD VENIPUNCTURE: CPT

## 2023-09-20 PROCEDURE — 82652 VIT D 1 25-DIHYDROXY: CPT

## 2023-09-20 PROCEDURE — 82607 VITAMIN B-12: CPT

## 2023-09-20 PROCEDURE — 82043 UR ALBUMIN QUANTITATIVE: CPT

## 2023-09-23 LAB — VITAMIN D 1,25-DIHYDROXY: 79.1 PG/ML (ref 19.9–79.3)

## 2023-09-25 ENCOUNTER — OFFICE VISIT (OUTPATIENT)
Dept: PRIMARY CARE | Facility: CLINIC | Age: 38
End: 2023-09-25
Payer: COMMERCIAL

## 2023-09-25 VITALS
WEIGHT: 242 LBS | SYSTOLIC BLOOD PRESSURE: 130 MMHG | TEMPERATURE: 97.1 F | OXYGEN SATURATION: 96 % | BODY MASS INDEX: 47.26 KG/M2 | DIASTOLIC BLOOD PRESSURE: 80 MMHG | HEART RATE: 102 BPM

## 2023-09-25 DIAGNOSIS — F32.A DEPRESSIVE DISORDER: ICD-10-CM

## 2023-09-25 DIAGNOSIS — F41.9 ANXIETY: ICD-10-CM

## 2023-09-25 DIAGNOSIS — F17.210 CIGARETTE SMOKER: ICD-10-CM

## 2023-09-25 DIAGNOSIS — G47.01 INSOMNIA DUE TO MEDICAL CONDITION: ICD-10-CM

## 2023-09-25 DIAGNOSIS — E11.65 POORLY CONTROLLED TYPE 2 DIABETES MELLITUS (MULTI): ICD-10-CM

## 2023-09-25 DIAGNOSIS — M54.2 ACUTE NECK PAIN: ICD-10-CM

## 2023-09-25 DIAGNOSIS — E53.8 VITAMIN B12 DEFICIENCY: ICD-10-CM

## 2023-09-25 DIAGNOSIS — M54.50 LOW BACK PAIN, UNSPECIFIED BACK PAIN LATERALITY, UNSPECIFIED CHRONICITY, UNSPECIFIED WHETHER SCIATICA PRESENT: ICD-10-CM

## 2023-09-25 DIAGNOSIS — E03.9 HYPOTHYROIDISM IN ADULT: ICD-10-CM

## 2023-09-25 DIAGNOSIS — E78.2 MIXED HYPERLIPIDEMIA: ICD-10-CM

## 2023-09-25 DIAGNOSIS — G47.33 OSA (OBSTRUCTIVE SLEEP APNEA): ICD-10-CM

## 2023-09-25 DIAGNOSIS — E66.01 OBESITY, MORBID (MORE THAN 100 LBS OVER IDEAL WEIGHT OR BMI > 40) (MULTI): ICD-10-CM

## 2023-09-25 DIAGNOSIS — M79.89 LEG SWELLING: ICD-10-CM

## 2023-09-25 DIAGNOSIS — E55.9 VITAMIN D INSUFFICIENCY: ICD-10-CM

## 2023-09-25 DIAGNOSIS — E11.9 TYPE 2 DIABETES MELLITUS WITHOUT COMPLICATION, WITHOUT LONG-TERM CURRENT USE OF INSULIN (MULTI): Primary | ICD-10-CM

## 2023-09-25 PROCEDURE — 3008F BODY MASS INDEX DOCD: CPT | Performed by: INTERNAL MEDICINE

## 2023-09-25 PROCEDURE — 3079F DIAST BP 80-89 MM HG: CPT | Performed by: INTERNAL MEDICINE

## 2023-09-25 PROCEDURE — 3044F HG A1C LEVEL LT 7.0%: CPT | Performed by: INTERNAL MEDICINE

## 2023-09-25 PROCEDURE — 99214 OFFICE O/P EST MOD 30 MIN: CPT | Performed by: INTERNAL MEDICINE

## 2023-09-25 PROCEDURE — 3075F SYST BP GE 130 - 139MM HG: CPT | Performed by: INTERNAL MEDICINE

## 2023-09-25 RX ORDER — METFORMIN HYDROCHLORIDE 500 MG/1
1000 TABLET, EXTENDED RELEASE ORAL DAILY
Qty: 180 TABLET | Refills: 1 | Status: SHIPPED | OUTPATIENT
Start: 2023-09-25 | End: 2024-04-04

## 2023-09-25 RX ORDER — BUPROPION HYDROCHLORIDE 150 MG/1
150 TABLET, EXTENDED RELEASE ORAL EVERY 12 HOURS
Qty: 180 TABLET | Refills: 1 | Status: SHIPPED | OUTPATIENT
Start: 2023-09-25

## 2023-09-25 RX ORDER — CYCLOBENZAPRINE HCL 10 MG
10 TABLET ORAL EVERY 8 HOURS PRN
Qty: 60 TABLET | Refills: 0 | Status: SHIPPED | OUTPATIENT
Start: 2023-09-25 | End: 2024-03-26 | Stop reason: HOSPADM

## 2023-09-25 RX ORDER — CHOLECALCIFEROL (VITAMIN D3) 25 MCG
50 TABLET ORAL DAILY
Qty: 180 TABLET | Refills: 1 | Status: SHIPPED | OUTPATIENT
Start: 2023-09-25 | End: 2024-03-25

## 2023-09-25 RX ORDER — FENOFIBRATE 160 MG/1
160 TABLET ORAL DAILY
Qty: 90 TABLET | Refills: 1 | Status: SHIPPED | OUTPATIENT
Start: 2023-09-25 | End: 2024-09-24

## 2023-09-25 RX ORDER — GABAPENTIN 300 MG/1
300 CAPSULE ORAL NIGHTLY
Qty: 90 CAPSULE | Refills: 1 | Status: SHIPPED | OUTPATIENT
Start: 2023-09-25

## 2023-09-25 RX ORDER — LANCETS 33 GAUGE
1 EACH MISCELLANEOUS DAILY
Qty: 100 EACH | Refills: 3 | Status: SHIPPED | OUTPATIENT
Start: 2023-09-25

## 2023-09-25 RX ORDER — LEVOTHYROXINE SODIUM 75 UG/1
75 TABLET ORAL DAILY
Qty: 90 TABLET | Refills: 1 | Status: SHIPPED | OUTPATIENT
Start: 2023-09-25

## 2023-09-25 RX ORDER — DULOXETIN HYDROCHLORIDE 60 MG/1
60 CAPSULE, DELAYED RELEASE ORAL DAILY
Qty: 90 CAPSULE | Refills: 3 | Status: SHIPPED | OUTPATIENT
Start: 2023-09-25 | End: 2024-09-24

## 2023-09-25 RX ORDER — MELATONIN 10 MG
20 CAPSULE ORAL NIGHTLY
Qty: 180 CAPSULE | Refills: 1 | Status: SHIPPED | OUTPATIENT
Start: 2023-09-25

## 2023-09-25 RX ORDER — SPIRONOLACTONE 25 MG/1
12.5 TABLET ORAL DAILY
Qty: 30 TABLET | Refills: 11 | Status: SHIPPED | OUTPATIENT
Start: 2023-09-25 | End: 2024-03-18 | Stop reason: WASHOUT

## 2023-09-25 RX ORDER — FUROSEMIDE 20 MG/1
20 TABLET ORAL DAILY PRN
Qty: 90 TABLET | Refills: 1 | Status: SHIPPED | OUTPATIENT
Start: 2023-09-25 | End: 2024-03-18 | Stop reason: WASHOUT

## 2023-09-25 RX ORDER — PIOGLITAZONEHYDROCHLORIDE 15 MG/1
15 TABLET ORAL DAILY
Qty: 90 TABLET | Refills: 1 | Status: CANCELLED | OUTPATIENT
Start: 2023-09-25

## 2023-09-25 RX ORDER — MIRTAZAPINE 15 MG/1
TABLET, FILM COATED ORAL
Qty: 60 TABLET | Refills: 0 | Status: SHIPPED | OUTPATIENT
Start: 2023-09-25 | End: 2023-11-06

## 2023-09-25 RX ORDER — HYDROXYZINE HYDROCHLORIDE 25 MG/1
25 TABLET, FILM COATED ORAL 3 TIMES DAILY PRN
Qty: 90 TABLET | Refills: 1 | Status: SHIPPED | OUTPATIENT
Start: 2023-09-25

## 2023-09-25 RX ORDER — ATORVASTATIN CALCIUM 20 MG/1
20 TABLET, FILM COATED ORAL DAILY
Qty: 90 TABLET | Refills: 1 | Status: SHIPPED | OUTPATIENT
Start: 2023-09-25 | End: 2024-04-15

## 2023-09-25 RX ORDER — DEXTROMETHORPHAN HYDROBROMIDE, GUAIFENESIN 5; 100 MG/5ML; MG/5ML
650 LIQUID ORAL AS NEEDED
Qty: 90 TABLET | Refills: 1 | Status: SHIPPED | OUTPATIENT
Start: 2023-09-25 | End: 2024-03-26 | Stop reason: HOSPADM

## 2023-09-25 RX ORDER — BLOOD-GLUCOSE METER
1 EACH MISCELLANEOUS 2 TIMES DAILY
Qty: 100 STRIP | Refills: 3 | Status: SHIPPED | OUTPATIENT
Start: 2023-09-25

## 2023-09-25 RX ORDER — ACETAMINOPHEN, DEXTROMETHORPHAN HBR, DOXYLAMINE SUCCINATE, PHENYLEPHRINE HCL 650; 20; 12.5; 1 MG/30ML; MG/30ML; MG/30ML; MG/30ML
1 SOLUTION ORAL DAILY
Qty: 90 TABLET | Refills: 3 | Status: SHIPPED | OUTPATIENT
Start: 2023-09-25 | End: 2024-09-24

## 2023-09-25 SDOH — ECONOMIC STABILITY: FOOD INSECURITY: WITHIN THE PAST 12 MONTHS, THE FOOD YOU BOUGHT JUST DIDN'T LAST AND YOU DIDN'T HAVE MONEY TO GET MORE.: NEVER TRUE

## 2023-09-25 SDOH — ECONOMIC STABILITY: FOOD INSECURITY: WITHIN THE PAST 12 MONTHS, YOU WORRIED THAT YOUR FOOD WOULD RUN OUT BEFORE YOU GOT MONEY TO BUY MORE.: NEVER TRUE

## 2023-09-25 SDOH — ECONOMIC STABILITY: TRANSPORTATION INSECURITY
IN THE PAST 12 MONTHS, HAS THE LACK OF TRANSPORTATION KEPT YOU FROM MEDICAL APPOINTMENTS OR FROM GETTING MEDICATIONS?: NO

## 2023-09-25 SDOH — ECONOMIC STABILITY: TRANSPORTATION INSECURITY
IN THE PAST 12 MONTHS, HAS LACK OF TRANSPORTATION KEPT YOU FROM MEETINGS, WORK, OR FROM GETTING THINGS NEEDED FOR DAILY LIVING?: NO

## 2023-09-25 ASSESSMENT — PATIENT HEALTH QUESTIONNAIRE - PHQ9
1. LITTLE INTEREST OR PLEASURE IN DOING THINGS: NOT AT ALL
2. FEELING DOWN, DEPRESSED OR HOPELESS: NOT AT ALL
SUM OF ALL RESPONSES TO PHQ9 QUESTIONS 1 & 2: 0

## 2023-09-25 NOTE — ASSESSMENT & PLAN NOTE
Continue current med therapies, will try to improve glucose control and weight loss to help promote reduction in TG levels

## 2023-09-25 NOTE — ASSESSMENT & PLAN NOTE
Patient has almost quit smoking entirely she states, patient is encouraged to continue to decrease smoking until she quits entirely

## 2023-09-25 NOTE — ASSESSMENT & PLAN NOTE
Patient's control of DM type 2 has improved, she has lost some weight, TG levels are still elevated, she may benefit from additional therapies for DM that would promote weight loss. Trial of Ozempic, will order to see if this is covered for the patient at this time, continue Jardiance and metformin also.

## 2023-09-25 NOTE — PROGRESS NOTES
chief Complaint/HPI:    Patient's  passed unexpectedly. Patient got a new job, she feels less stressed now. She may try to get her old job back due to financial issues. Patient has been monitoring diet carefully, she has lost about 11# she thinks.  She has been physically active     DM type 2: patient takes Jardiance and metformin, she is more physically active now., she would like to continue to lose weight     CTS: patient had right carpel tunnel surgery, right hand is less numb, she plans to have the left carpel tunnel surgery completed soon.      Hypothyroidism: patient takes levothyroxine daily     Hyperlipidemia: patient takes Lipitor and fenofibrate     Patient just returned from [place], she would like to move to [place] since her family does live there     ROS otherwise negative aside from what was mentioned above in HPI.      Patient Active Problem List   Diagnosis    Acute maxillary sinusitis    Acute neck pain    Acute UTI    Anxiety    Asthma    Bilateral edema of lower extremity    Chronic sciatica of left side    Cigarette smoker    Cracking skin    Depressive disorder    Eczema    Hypothyroidism in adult    Incisional hernia, without obstruction or gangrene    Infection of tooth    Loss of libido    Low back pain    Mixed hyperlipidemia    Type 2 diabetes mellitus without complication, without long-term current use of insulin (CMS/[facility])    Obesity, morbid (more than 100 lbs over ideal weight or BMI > 40) (CMS/[facility])    Obstructive sleep apnea    Palpitations    Primary osteoarthritis of left hip    Right knee pain    Left otitis media    Right otitis media    S/P abdominal hysterectomy and left salpingo-oophorectomy    Solitary kidney, congenital    Trapezius muscle spasm    Umbilical hernia without obstruction and without gangrene    Vitamin B12 deficiency    Vitamin D insufficiency    Wound, open, leg    Leg swelling    Amenorrhea    Carpal tunnel syndrome, bilateral upper limbs  "   Knee pain    Symptom of leg swelling    Bilateral carpal tunnel syndrome    Abdominal pain    Acute respiratory failure with hypoxia (CMS/HCC)    HCAP (healthcare-associated pneumonia)    Hypokalemia    Sepsis (CMS/HCC)    Tachycardia    Torsion of left ovary and ovarian pedicle    Hypoxia    Obesity    DRISS (obstructive sleep apnea)         Past Medical History:   Diagnosis Date    Developmental ovarian cyst     Multiple developmental ovarian cysts    Personal history of other diseases of the musculoskeletal system and connective tissue     History of arthritis    Personal history of other diseases of the musculoskeletal system and connective tissue     History of chronic back pain    Personal history of other diseases of the respiratory system     History of bronchitis    Personal history of other endocrine, nutritional and metabolic disease     History of morbid obesity    Personal history of pneumonia (recurrent)     History of pneumonia     Past Surgical History:   Procedure Laterality Date    CARPAL TUNNEL RELEASE Right 04/25/2023    OTHER SURGICAL HISTORY  09/10/2019    Laparoscopy    OTHER SURGICAL HISTORY  09/10/2019    Oophorectomy    OTHER SURGICAL HISTORY  12/11/2019    Umbilical hernia repair     Social History     Social History Narrative    Not on file         ALLERGIES  Patient has no known allergies.      MEDICATIONS  Current Outpatient Medications on File Prior to Visit   Medication Sig Dispense Refill    pen needle, diabetic 31 gauge x 5/16\" needle Use one daily to administer Victoza      [DISCONTINUED] acetaminophen (Tylenol 8 Hour) 650 mg ER tablet Take by mouth if needed. Take tablet prn      [DISCONTINUED] atorvastatin (Lipitor) 20 mg tablet Take 1 tablet (20 mg) by mouth once daily.      [DISCONTINUED] blood sugar diagnostic (OneTouch Verio test strips) strip Test once every day      [DISCONTINUED] buPROPion SR (Wellbutrin SR) 150 mg 12 hr tablet TAKE ONE TABLET BY MOUTH EVERY 12 HOURS 180 " tablet 1    [DISCONTINUED] cholecalciferol (Vitamin D-3) 25 MCG (1000 UT) tablet Take 2 tablets (50 mcg) by mouth once daily.      [DISCONTINUED] cyanocobalamin, vitamin B-12, (Vitamin B-12) 1,000 mcg tablet extended release Take 1,000 mcg by mouth once daily. As directed 90 tablet 3    [DISCONTINUED] DULoxetine (Cymbalta) 60 mg DR capsule Take 1 capsule (60 mg) by mouth once daily. 90 capsule 3    [DISCONTINUED] empagliflozin (Jardiance) 25 mg Take 1 tablet (25 mg) by mouth once daily. Take 1 tablet daily, start in 2 weeks      [DISCONTINUED] gabapentin (Neurontin) 300 mg capsule Take 1 capsule (300 mg) by mouth once daily at bedtime.      [DISCONTINUED] hydrOXYzine HCL (Atarax) 25 mg tablet Take 1 tablet (25 mg) by mouth 3 times a day as needed for anxiety. 90 tablet 1    [DISCONTINUED] lancets 33 gauge misc Use as directed      [DISCONTINUED] levothyroxine (Synthroid, Levoxyl) 75 mcg tablet Take 1 tablet (75 mcg) by mouth once daily. As directed      [DISCONTINUED] melatonin 10 mg capsule Take 2 capsules (20 mg) by mouth once daily at bedtime.      [DISCONTINUED] metFORMIN  mg 24 hr tablet TAKE TWO TABLETS BY MOUTH DAILY 180 tablet 1    [DISCONTINUED] mirtazapine (Remeron) 15 mg tablet TAKE ONE TABLET BY MOUTH nightly AT BEDTIME. take 2 nightly if one tab is not effective 60 tablet 0    [DISCONTINUED] spironolactone (Aldactone) 25 mg tablet Take 0.5 tablets (12.5 mg) by mouth once daily. 30 tablet 11    HYDROcodone-acetaminophen (Norco) 5-325 mg tablet Take 2 tablets by mouth every 4 hours if needed.      magnesium oxide 500 mg tablet magnesium oxide 500 mg tablet   Take by oral route.      [DISCONTINUED] cyclobenzaprine (Flexeril) 10 mg tablet Take 1 tablet (10 mg) by mouth every 8 hours if needed.      [DISCONTINUED] fenofibrate (Triglide) 160 mg tablet Take 1 tablet (160 mg) by mouth once daily. 90 tablet 1    [DISCONTINUED] furosemide (Lasix) 20 mg tablet Take 1 tablet (20 mg) by mouth once daily as  needed.      [DISCONTINUED] metFORMIN XR (Glucophage-XR) 500 mg 24 hr tablet TAKE TWO TABLETS BY MOUTH DAILY 60 tablet 0    [DISCONTINUED] pioglitazone (Actos) 15 mg tablet Take 1 tablet (15 mg) by mouth once daily.       No current facility-administered medications on file prior to visit.         PHYSICAL EXAM  /80 (BP Location: Left arm, Patient Position: Sitting, BP Cuff Size: Large adult)   Pulse 102   Temp 36.2 °C (97.1 °F)   Wt 110 kg (242 lb)   SpO2 96%   BMI 47.26 kg/m²   Body mass index is 47.26 kg/m².  Gen: Alert, NAD,she is morbidly obese  HEENT:  EOMI, conjunctiva and sclera normal in appearance, no thyromegaly or masses, no carotid bruits  Respiratory:  Lungs CTAB  Cardiovascular:  Heart RRR. No M/R/G, no peripheral edema is noted, a few varicosities are noted on the lower extremities  Neuro:  Gross motor and sensory intact  Skin:  No suspicious lesions present on exposed skin    ASSESSMENT/PLAN  Problem List Items Addressed This Visit       Acute neck pain    Relevant Medications    cyclobenzaprine (Flexeril) 10 mg tablet    Other Relevant Orders    Comprehensive metabolic panel    Anxiety    Relevant Medications    buPROPion SR (Wellbutrin SR) 150 mg 12 hr tablet    hydrOXYzine HCL (Atarax) 25 mg tablet    Other Relevant Orders    Comprehensive metabolic panel    Cigarette smoker    Current Assessment & Plan     Patient has almost quit smoking entirely she states, patient is encouraged to continue to decrease smoking until she quits entirely         Relevant Orders    Comprehensive metabolic panel    Depressive disorder    Current Assessment & Plan     Still takes duloxetine, no med changes         Relevant Medications    DULoxetine (Cymbalta) 60 mg DR capsule    melatonin 10 mg capsule    mirtazapine (Remeron) 15 mg tablet    Other Relevant Orders    Comprehensive metabolic panel    Hypothyroidism in adult    Relevant Medications    levothyroxine (Synthroid, Levoxyl) 75 mcg tablet    Other  Relevant Orders    Comprehensive metabolic panel    TSH with reflex to Free T4 if abnormal    Low back pain    Relevant Medications    gabapentin (Neurontin) 300 mg capsule    Other Relevant Orders    Comprehensive metabolic panel    Mixed hyperlipidemia    Current Assessment & Plan     Continue current med therapies, will try to improve glucose control and weight loss to help promote reduction in TG levels         Relevant Medications    atorvastatin (Lipitor) 20 mg tablet    fenofibrate (Triglide) 160 mg tablet    Other Relevant Orders    Comprehensive metabolic panel    Lipid Panel    Type 2 diabetes mellitus without complication, without long-term current use of insulin (CMS/[facility]) - Primary    Current Assessment & Plan     Patient's control of DM type 2 has improved, she has lost some weight, TG levels are still elevated, she may benefit from additional therapies for DM that would promote weight loss. Trial of Ozempic, will order to see if this is covered for the patient at this time, continue Jardiance and metformin also.          Relevant Medications    blood sugar diagnostic (OneTouch Verio test strips) strip    empagliflozin (Jardiance) 25 mg    lancets 33 gauge misc    semaglutide 0.25 mg or 0.5 mg (2 mg/3 mL) pen injector    Other Relevant Orders    Hemoglobin A1c    Comprehensive metabolic panel    Obesity, morbid (more than 100 lbs over ideal weight or BMI > 40) (CMS/[facility])    Current Assessment & Plan     Trial of Mounjaro to improve glucose control and to promote weight loss         Relevant Orders    Comprehensive metabolic panel    Vitamin B12 deficiency    Relevant Medications    cyanocobalamin, vitamin B-12, (Vitamin B-12) 1,000 mcg tablet extended release    Other Relevant Orders    Comprehensive metabolic panel    Vitamin B12    Vitamin D insufficiency    Current Assessment & Plan     Recheck labs, stable now         Relevant Medications    cholecalciferol (Vitamin D-3) 25 MCG (1000 UT) tablet     Other Relevant Orders    Comprehensive metabolic panel    Vitamin D 25-Hydroxy,Total (for eval of Vitamin D levels)    Leg swelling    Relevant Medications    acetaminophen (Tylenol 8 HOUR) 650 mg ER tablet    furosemide (Lasix) 20 mg tablet    spironolactone (Aldactone) 25 mg tablet    Other Relevant Orders    Comprehensive metabolic panel    DRISS (obstructive sleep apnea)    Relevant Medications    mirtazapine (Remeron) 15 mg tablet    Other Relevant Orders    Comprehensive metabolic panel     Other Visit Diagnoses       Poorly controlled type 2 diabetes mellitus (CMS/HCC)        Relevant Medications    metFORMIN  mg 24 hr tablet    Other Relevant Orders    Comprehensive metabolic panel    Insomnia due to medical condition        Relevant Medications    mirtazapine (Remeron) 15 mg tablet    Other Relevant Orders    Comprehensive metabolic panel          Follow up in 6 months, will modify dose of Ozempic as needed, recheck labs prior to next visit    Tal Piña MD

## 2023-11-04 DIAGNOSIS — F32.A DEPRESSIVE DISORDER: ICD-10-CM

## 2023-11-04 DIAGNOSIS — G47.33 OSA (OBSTRUCTIVE SLEEP APNEA): ICD-10-CM

## 2023-11-04 DIAGNOSIS — G47.01 INSOMNIA DUE TO MEDICAL CONDITION: ICD-10-CM

## 2023-11-06 RX ORDER — MIRTAZAPINE 15 MG/1
TABLET, FILM COATED ORAL
Qty: 90 TABLET | Refills: 1 | Status: SHIPPED | OUTPATIENT
Start: 2023-11-06 | End: 2024-02-12

## 2023-12-26 ENCOUNTER — TELEPHONE (OUTPATIENT)
Dept: PRIMARY CARE | Facility: CLINIC | Age: 38
End: 2023-12-26
Payer: COMMERCIAL

## 2023-12-26 DIAGNOSIS — U07.1 COVID-19: Primary | ICD-10-CM

## 2023-12-27 ENCOUNTER — TELEPHONE (OUTPATIENT)
Dept: PRIMARY CARE | Facility: CLINIC | Age: 38
End: 2023-12-27
Payer: COMMERCIAL

## 2023-12-27 RX ORDER — NIRMATRELVIR AND RITONAVIR 300-100 MG
3 KIT ORAL 2 TIMES DAILY
Qty: 30 TABLET | Refills: 0 | Status: SHIPPED | OUTPATIENT
Start: 2023-12-27 | End: 2024-03-18 | Stop reason: WASHOUT

## 2023-12-27 NOTE — TELEPHONE ENCOUNTER
Pt was rx'd paxlovid but because she has kidney issues she needs a lower dose. Can we please resend this in a lower dose for her?  RAVENNA GIANT EAGLE.

## 2024-01-24 ENCOUNTER — PATIENT MESSAGE (OUTPATIENT)
Dept: PRIMARY CARE | Facility: CLINIC | Age: 39
End: 2024-01-24
Payer: COMMERCIAL

## 2024-01-24 DIAGNOSIS — E11.9 TYPE 2 DIABETES MELLITUS WITHOUT COMPLICATION, WITHOUT LONG-TERM CURRENT USE OF INSULIN (MULTI): Primary | ICD-10-CM

## 2024-01-30 ENCOUNTER — TELEPHONE (OUTPATIENT)
Dept: PRIMARY CARE | Facility: CLINIC | Age: 39
End: 2024-01-30
Payer: COMMERCIAL

## 2024-02-12 DIAGNOSIS — G47.01 INSOMNIA DUE TO MEDICAL CONDITION: ICD-10-CM

## 2024-02-12 DIAGNOSIS — G47.33 OSA (OBSTRUCTIVE SLEEP APNEA): ICD-10-CM

## 2024-02-12 DIAGNOSIS — F32.A DEPRESSIVE DISORDER: ICD-10-CM

## 2024-02-12 RX ORDER — MIRTAZAPINE 15 MG/1
TABLET, FILM COATED ORAL
Qty: 90 TABLET | Refills: 0 | Status: SHIPPED | OUTPATIENT
Start: 2024-02-12 | End: 2024-04-04

## 2024-03-14 ENCOUNTER — OFFICE VISIT (OUTPATIENT)
Dept: ORTHOPEDIC SURGERY | Facility: CLINIC | Age: 39
End: 2024-03-14
Payer: COMMERCIAL

## 2024-03-14 ENCOUNTER — PREP FOR PROCEDURE (OUTPATIENT)
Dept: ORTHOPEDIC SURGERY | Facility: CLINIC | Age: 39
End: 2024-03-14

## 2024-03-14 ENCOUNTER — LAB (OUTPATIENT)
Dept: LAB | Facility: LAB | Age: 39
End: 2024-03-14
Payer: COMMERCIAL

## 2024-03-14 DIAGNOSIS — F17.210 CIGARETTE SMOKER: ICD-10-CM

## 2024-03-14 DIAGNOSIS — M54.50 LOW BACK PAIN, UNSPECIFIED BACK PAIN LATERALITY, UNSPECIFIED CHRONICITY, UNSPECIFIED WHETHER SCIATICA PRESENT: ICD-10-CM

## 2024-03-14 DIAGNOSIS — M79.89 LEG SWELLING: ICD-10-CM

## 2024-03-14 DIAGNOSIS — E11.65 POORLY CONTROLLED TYPE 2 DIABETES MELLITUS (MULTI): ICD-10-CM

## 2024-03-14 DIAGNOSIS — G56.02 CARPAL TUNNEL SYNDROME ON LEFT: ICD-10-CM

## 2024-03-14 DIAGNOSIS — E03.9 HYPOTHYROIDISM IN ADULT: ICD-10-CM

## 2024-03-14 DIAGNOSIS — M54.2 ACUTE NECK PAIN: ICD-10-CM

## 2024-03-14 DIAGNOSIS — E53.8 VITAMIN B12 DEFICIENCY: ICD-10-CM

## 2024-03-14 DIAGNOSIS — G47.33 OSA (OBSTRUCTIVE SLEEP APNEA): ICD-10-CM

## 2024-03-14 DIAGNOSIS — E11.9 TYPE 2 DIABETES MELLITUS WITHOUT COMPLICATION, WITHOUT LONG-TERM CURRENT USE OF INSULIN (MULTI): ICD-10-CM

## 2024-03-14 DIAGNOSIS — E55.9 VITAMIN D INSUFFICIENCY: ICD-10-CM

## 2024-03-14 DIAGNOSIS — G47.01 INSOMNIA DUE TO MEDICAL CONDITION: ICD-10-CM

## 2024-03-14 DIAGNOSIS — E78.2 MIXED HYPERLIPIDEMIA: ICD-10-CM

## 2024-03-14 DIAGNOSIS — F32.A DEPRESSIVE DISORDER: ICD-10-CM

## 2024-03-14 DIAGNOSIS — E66.01 OBESITY, MORBID (MORE THAN 100 LBS OVER IDEAL WEIGHT OR BMI > 40) (MULTI): ICD-10-CM

## 2024-03-14 DIAGNOSIS — F41.9 ANXIETY: ICD-10-CM

## 2024-03-14 LAB
25(OH)D3 SERPL-MCNC: 34 NG/ML (ref 30–100)
ALBUMIN SERPL BCP-MCNC: 4.2 G/DL (ref 3.4–5)
ALP SERPL-CCNC: 51 U/L (ref 33–110)
ALT SERPL W P-5'-P-CCNC: 22 U/L (ref 7–45)
ANION GAP SERPL CALC-SCNC: 8 MMOL/L (ref 10–20)
AST SERPL W P-5'-P-CCNC: 17 U/L (ref 9–39)
BILIRUB SERPL-MCNC: 0.3 MG/DL (ref 0–1.2)
BUN SERPL-MCNC: 12 MG/DL (ref 6–23)
CALCIUM SERPL-MCNC: 8.8 MG/DL (ref 8.6–10.3)
CHLORIDE SERPL-SCNC: 107 MMOL/L (ref 98–107)
CHOLEST SERPL-MCNC: 181 MG/DL (ref 0–199)
CHOLESTEROL/HDL RATIO: 4.2
CO2 SERPL-SCNC: 26 MMOL/L (ref 21–32)
CREAT SERPL-MCNC: 0.74 MG/DL (ref 0.5–1.05)
EGFRCR SERPLBLD CKD-EPI 2021: >90 ML/MIN/1.73M*2
EST. AVERAGE GLUCOSE BLD GHB EST-MCNC: 131 MG/DL
GLUCOSE SERPL-MCNC: 111 MG/DL (ref 74–99)
HBA1C MFR BLD: 6.2 %
HDLC SERPL-MCNC: 42.8 MG/DL
LDLC SERPL CALC-MCNC: 98 MG/DL
NON HDL CHOLESTEROL: 138 MG/DL (ref 0–149)
POTASSIUM SERPL-SCNC: 4.1 MMOL/L (ref 3.5–5.3)
PROT SERPL-MCNC: 6.9 G/DL (ref 6.4–8.2)
SODIUM SERPL-SCNC: 137 MMOL/L (ref 136–145)
TRIGL SERPL-MCNC: 202 MG/DL (ref 0–149)
TSH SERPL-ACNC: 1.85 MIU/L (ref 0.44–3.98)
VIT B12 SERPL-MCNC: 294 PG/ML (ref 211–911)
VLDL: 40 MG/DL (ref 0–40)

## 2024-03-14 PROCEDURE — 80061 LIPID PANEL: CPT

## 2024-03-14 PROCEDURE — 99214 OFFICE O/P EST MOD 30 MIN: CPT | Performed by: ORTHOPAEDIC SURGERY

## 2024-03-14 PROCEDURE — 3008F BODY MASS INDEX DOCD: CPT | Performed by: ORTHOPAEDIC SURGERY

## 2024-03-14 PROCEDURE — 83036 HEMOGLOBIN GLYCOSYLATED A1C: CPT

## 2024-03-14 PROCEDURE — 80053 COMPREHEN METABOLIC PANEL: CPT

## 2024-03-14 PROCEDURE — 84443 ASSAY THYROID STIM HORMONE: CPT

## 2024-03-14 PROCEDURE — 36415 COLL VENOUS BLD VENIPUNCTURE: CPT

## 2024-03-14 PROCEDURE — 82306 VITAMIN D 25 HYDROXY: CPT

## 2024-03-14 PROCEDURE — 82607 VITAMIN B-12: CPT

## 2024-03-14 NOTE — PROGRESS NOTES
38 y.o. female presents today for follow-up left hand numbness, tingling, weakness and pain. The patient reports symptoms for months, getting worse.  Pain is controlled.  Reports no previous surgeries, injections or trauma to the area.  Reports pain worse with use, better at rest.  Pain numb and tingly, wakes them from sleep.   Worse driving a car and talking on a phone.   Had a right done and is doing very well.  Would like to have her left done now.  Splints not helping much    Review of Systems    Constitutional: see HPI, no fever, no chills, not feeling tired, no significant weight gain or weight loss.   Eyes: No vision changes  ENT: no nosebleeds.   Cardiovascular: no chest pain.   Respiratory: no shortness of breath and no cough.   Gastrointestinal: no abdominal pain, no nausea, no vomiting and no diarrhea.   Musculoskeletal: per HPI  Neurological: no headache, no gait disturbances  Psychiatric: no depression and no sleep disturbances.   Endocrine: no muscle weakness and no muscle cramps.   Hematologic/Lymphatic: no swollen glands and no tendency for easy bruising or excessive swelling.     Patient's past medical history, past surgical history, allergies, and medications have been reviewed unless otherwise noted in the chart.     Carpal Tunnel Exam  Inspection:  no evidence of infection, no edema, no erythema, no ecchymosis, Palpation:  compartments are soft, no pain with palpation, Range of Motion:  full wrist and finger range of motion, Stability:  no wrist instability detected, Strength:  4/5 APB and intrinsics, Skin:  intact, Vascular:  capillary refill <2 seconds distally, Sensation:  decreased in the median nerve distribution, Test:  positive Tinel's at the Carpal Tunnel, positive Direct Carpal Tunnel Compression Test      Constitutional   General appearance: Alert and in no acute distress. Well developed, well nourished.    Eyes   External Eye, Conjunctiva and lids: Normal external exam - pupils were  equal in size, round, reactive to light (PERRL) with normal accommodation and extraocular movements intact (EOMI).   Ears, Nose, Mouth, and Throat   Hearing: Normal.   Neck   Neck: No neck mass was observed. Supple.   Pulmonary   Respiratory effort: No respiratory distress.   Auscultation of lungs: Clear bilateral breath sounds.   Cardiovascular   Examination of extremities: No peripheral edema.   Auscultation of heart: Heart rate and rhythm were normal   Psychiatric   Judgment and insight: Intact.   Orientation to person, place, and time: Alert and oriented x 3.       Mood and affect: Normal.      EMG had shown markedly severe left carpal tunnel and end-stage right    Left carpal tunnel syndrome  Surgery discussion: I discussed the diagnosis and treatment options with the patient today along with their associated risks and benefits. After thorough discussion, the patient has elected to proceed with surgical intervention. The patient understands the risks of,including, but not limited to, bleeding, infection, loss of life or limb, pain, permanent numbness, tingling, weakness, loss of motion, failure of the goals of surgery or the potential need for additional surgery. The patient would like to accept these risks and proceed. All questions were answered to the patients satisfaction and the patient seems satisfied with the plan.    Plan left E [facility]  Follow-up 2 weeks after

## 2024-03-18 ENCOUNTER — OFFICE VISIT (OUTPATIENT)
Dept: PRIMARY CARE | Facility: CLINIC | Age: 39
End: 2024-03-18
Payer: COMMERCIAL

## 2024-03-18 VITALS
DIASTOLIC BLOOD PRESSURE: 73 MMHG | HEART RATE: 115 BPM | TEMPERATURE: 97.7 F | SYSTOLIC BLOOD PRESSURE: 121 MMHG | RESPIRATION RATE: 16 BRPM | BODY MASS INDEX: 48.57 KG/M2 | OXYGEN SATURATION: 99 % | HEIGHT: 60 IN | WEIGHT: 247.4 LBS

## 2024-03-18 DIAGNOSIS — Z79.899 MEDICATION MANAGEMENT: ICD-10-CM

## 2024-03-18 DIAGNOSIS — E66.9 OBESITY, UNSPECIFIED CLASSIFICATION, UNSPECIFIED OBESITY TYPE, UNSPECIFIED WHETHER SERIOUS COMORBIDITY PRESENT: ICD-10-CM

## 2024-03-18 DIAGNOSIS — F32.A DEPRESSIVE DISORDER: ICD-10-CM

## 2024-03-18 DIAGNOSIS — J45.909 MILD ASTHMA WITHOUT COMPLICATION, UNSPECIFIED WHETHER PERSISTENT (HHS-HCC): ICD-10-CM

## 2024-03-18 DIAGNOSIS — E78.2 MIXED HYPERLIPIDEMIA: Primary | ICD-10-CM

## 2024-03-18 DIAGNOSIS — F41.9 ANXIETY: ICD-10-CM

## 2024-03-18 DIAGNOSIS — E03.9 HYPOTHYROIDISM IN ADULT: ICD-10-CM

## 2024-03-18 DIAGNOSIS — E55.9 VITAMIN D INSUFFICIENCY: ICD-10-CM

## 2024-03-18 DIAGNOSIS — E11.42 TYPE 2 DIABETES MELLITUS WITH DIABETIC POLYNEUROPATHY, WITHOUT LONG-TERM CURRENT USE OF INSULIN (MULTI): ICD-10-CM

## 2024-03-18 DIAGNOSIS — E11.9 TYPE 2 DIABETES MELLITUS WITHOUT COMPLICATION, WITHOUT LONG-TERM CURRENT USE OF INSULIN (MULTI): ICD-10-CM

## 2024-03-18 DIAGNOSIS — F17.210 CIGARETTE SMOKER: ICD-10-CM

## 2024-03-18 DIAGNOSIS — J96.01 ACUTE RESPIRATORY FAILURE WITH HYPOXIA (MULTI): ICD-10-CM

## 2024-03-18 DIAGNOSIS — E53.8 VITAMIN B12 DEFICIENCY: ICD-10-CM

## 2024-03-18 PROBLEM — I73.9 PERIPHERAL VASCULAR DISEASE (CMS-HCC): Status: ACTIVE | Noted: 2023-04-25

## 2024-03-18 PROBLEM — I49.9 CARDIAC ARRHYTHMIA: Status: ACTIVE | Noted: 2023-04-25

## 2024-03-18 PROBLEM — Z86.39 HISTORY OF OBESITY: Status: ACTIVE | Noted: 2024-03-18

## 2024-03-18 PROCEDURE — 99214 OFFICE O/P EST MOD 30 MIN: CPT | Performed by: INTERNAL MEDICINE

## 2024-03-18 PROCEDURE — 3008F BODY MASS INDEX DOCD: CPT | Performed by: INTERNAL MEDICINE

## 2024-03-18 PROCEDURE — 3044F HG A1C LEVEL LT 7.0%: CPT | Performed by: INTERNAL MEDICINE

## 2024-03-18 PROCEDURE — 3078F DIAST BP <80 MM HG: CPT | Performed by: INTERNAL MEDICINE

## 2024-03-18 PROCEDURE — 3074F SYST BP LT 130 MM HG: CPT | Performed by: INTERNAL MEDICINE

## 2024-03-18 PROCEDURE — 3048F LDL-C <100 MG/DL: CPT | Performed by: INTERNAL MEDICINE

## 2024-03-18 SDOH — ECONOMIC STABILITY: FOOD INSECURITY: WITHIN THE PAST 12 MONTHS, YOU WORRIED THAT YOUR FOOD WOULD RUN OUT BEFORE YOU GOT MONEY TO BUY MORE.: NEVER TRUE

## 2024-03-18 SDOH — ECONOMIC STABILITY: FOOD INSECURITY: WITHIN THE PAST 12 MONTHS, THE FOOD YOU BOUGHT JUST DIDN'T LAST AND YOU DIDN'T HAVE MONEY TO GET MORE.: NEVER TRUE

## 2024-03-18 ASSESSMENT — PATIENT HEALTH QUESTIONNAIRE - PHQ9
2. FEELING DOWN, DEPRESSED OR HOPELESS: MORE THAN HALF THE DAYS
SUM OF ALL RESPONSES TO PHQ9 QUESTIONS 1 & 2: 2
1. LITTLE INTEREST OR PLEASURE IN DOING THINGS: NOT AT ALL
10. IF YOU CHECKED OFF ANY PROBLEMS, HOW DIFFICULT HAVE THESE PROBLEMS MADE IT FOR YOU TO DO YOUR WORK, TAKE CARE OF THINGS AT HOME, OR GET ALONG WITH OTHER PEOPLE: SOMEWHAT DIFFICULT

## 2024-03-18 ASSESSMENT — LIFESTYLE VARIABLES
HOW MANY STANDARD DRINKS CONTAINING ALCOHOL DO YOU HAVE ON A TYPICAL DAY: 1 OR 2
HOW OFTEN DO YOU HAVE SIX OR MORE DRINKS ON ONE OCCASION: NEVER
SKIP TO QUESTIONS 9-10: 1
AUDIT-C TOTAL SCORE: 1
HOW OFTEN DO YOU HAVE A DRINK CONTAINING ALCOHOL: MONTHLY OR LESS

## 2024-03-18 NOTE — PROGRESS NOTES
"Subjective   Patient ID: Norah Marquez is a 38 y.o. female who presents for Follow-up (Would like to look into different meds for antidepressants. Feels hers isn't working./Jardiance is too expensive as well.).    HPI     Depression/Anxiety: Patient is not seen by Psychology nor seeking Counseling. Her current medication regiment includes: Duloxetine 60 mg, Bupropion 150 mg BID, Hydroxyzine 25 mg PRN. Most recent Vit D and B12 levels are within normal limits. No SI, HI, or visual/auditory hallucinations. \"I'm managing\". She is willing to talk to someone at this time.     Insomnia: Patient is currently on Mirtazapine 15 mg. Patient smokes about 1 pack every 14 days, she trying to quit.     EBSB5BI: Patient is currently on Metformin 500 mg BID. Most recent Hgb A1c 6.2%. She was previously on Ozempic but was unable to refill due to insurance coverage issues. She is inquiring about Phentermine. She has been having issues with dietary and appetite control. I haven't met my deductible. Patient wonders about savings card. Patient has noted increased hunger since not taking Ozempic, she was only on 1 mg dose. Patient had now side effects except for constipation.  Patient has not able to receive coverage for Jardiance either.     CTS: S/p right carpal tunnel release 04/2023, she is scheduled for a left procedure with . She reports of improved symptoms to the right hand.    Hypothyroidism: Patient is currently on 75 mcg Levothyroxine. Most recent TSH is within normal limits at 1.85.    HLD: Patient is currently on 20 mg Atorvastatin taken nightly and 160 mg Fenofibrate. Most recent lipid panel: Chol 181, HDL 42.8, LDL 98, and .    ROS otherwise negative aside from what was mentioned above in HPI.     Objective   /73 (BP Location: Right arm, Patient Position: Sitting, BP Cuff Size: Large adult)   Pulse (!) 115   Temp 36.5 °C (97.7 °F)   Resp 16   Ht 1.524 m (5')   Wt 112 kg (247 lb 6.4 oz)   SpO2 " 99%   BMI 48.32 kg/m²     Physical Exam  Gen: Alert, NAD,she is morbidly obese  HEENT:  EOMI, conjunctiva and sclera normal in appearance, no thyromegaly or masses, no carotid bruits  Respiratory:  Lungs CTAB  Cardiovascular:  Heart RRR. No M/R/G, no peripheral edema is noted, a few varicosities are noted on the lower extremities  Neuro:  Gross motor and sensory intact  Skin:  No suspicious lesions present on exposed skin    Assessment/Plan   Problem List Items Addressed This Visit       Anxiety    Relevant Orders    Referral to Psychiatry    Asthma    Relevant Orders    CBC and Auto Differential    Comprehensive Metabolic Panel    Cigarette smoker    Relevant Orders    CBC and Auto Differential    Comprehensive Metabolic Panel    Depressive disorder     Continue med therapy, psychology evaluation ordered, patient needs to increase her activity also  to help with weight loss issue         Relevant Orders    CBC and Auto Differential    Comprehensive Metabolic Panel    TSH with reflex to Free T4 if abnormal    Referral to Psychiatry    Hypothyroidism in adult    Relevant Orders    CBC and Auto Differential    Comprehensive Metabolic Panel    Mixed hyperlipidemia - Primary     Continue current med therapies, recheck labs in 3-4 months         Relevant Orders    CBC and Auto Differential    Comprehensive Metabolic Panel    Lipid Panel    Type 2 diabetes mellitus with diabetic polyneuropathy, without long-term current use of insulin (CMS/[facility])     Resume Ozempic if possible, clinical pharmacy consulted for med management help. Advise maximizing dose prior to starting other med theapy, will refer to counselor/ psychologist also          Relevant Medications    semaglutide 2 mg/dose (8 mg/3 mL) pen injector    Other Relevant Orders    CBC and Auto Differential    Comprehensive Metabolic Panel    Vitamin B12 deficiency     Continue to monitor labs         Relevant Orders    CBC and Auto Differential    Comprehensive  Metabolic Panel    Vitamin B12    Vitamin D insufficiency    Relevant Orders    CBC and Auto Differential    Comprehensive Metabolic Panel    Vitamin D 25-Hydroxy,Total (for eval of Vitamin D levels)    RESOLVED: Acute respiratory failure with hypoxia (CMS/HCC)    Relevant Orders    CBC and Auto Differential    Comprehensive Metabolic Panel    Obesity    Relevant Medications    semaglutide 2 mg/dose (8 mg/3 mL) pen injector    Other Relevant Orders    CBC and Auto Differential    Comprehensive Metabolic Panel    Referral to Psychiatry     Other Visit Diagnoses       Medication management        Relevant Orders    Referral to Clinical Pharmacy    CBC and Auto Differential    Comprehensive Metabolic Panel          Renew Ozempic if possible , 2 mg daily, refer to psychology for assessment, recheck labs in 3-4 months    Follow up in 4 months

## 2024-03-18 NOTE — ASSESSMENT & PLAN NOTE
Resume Ozempic if possible, clinical pharmacy consulted for med management help. Advise maximizing dose prior to starting other med theapy, will refer to counselor/ psychologist also

## 2024-03-18 NOTE — ASSESSMENT & PLAN NOTE
Continue med therapy, psychology evaluation ordered, patient needs to increase her activity also  to help with weight loss issue

## 2024-03-20 ENCOUNTER — ANESTHESIA EVENT (OUTPATIENT)
Dept: OPERATING ROOM | Facility: HOSPITAL | Age: 39
End: 2024-03-20
Payer: COMMERCIAL

## 2024-03-24 DIAGNOSIS — E55.9 VITAMIN D INSUFFICIENCY: ICD-10-CM

## 2024-03-25 ENCOUNTER — PRE-ADMISSION TESTING (OUTPATIENT)
Dept: PREADMISSION TESTING | Facility: HOSPITAL | Age: 39
End: 2024-03-25
Payer: COMMERCIAL

## 2024-03-25 VITALS
DIASTOLIC BLOOD PRESSURE: 77 MMHG | HEART RATE: 99 BPM | RESPIRATION RATE: 18 BRPM | TEMPERATURE: 98.8 F | SYSTOLIC BLOOD PRESSURE: 122 MMHG

## 2024-03-25 DIAGNOSIS — Z01.818 ENCOUNTER FOR PREADMISSION TESTING: Primary | ICD-10-CM

## 2024-03-25 LAB
ERYTHROCYTE [DISTWIDTH] IN BLOOD BY AUTOMATED COUNT: 12.6 % (ref 11.5–14.5)
HCT VFR BLD AUTO: 43 % (ref 36–46)
HGB BLD-MCNC: 14.5 G/DL (ref 12–16)
MCH RBC QN AUTO: 30.4 PG (ref 26–34)
MCHC RBC AUTO-ENTMCNC: 33.7 G/DL (ref 32–36)
MCV RBC AUTO: 90 FL (ref 80–100)
NRBC BLD-RTO: 0 /100 WBCS (ref 0–0)
PLATELET # BLD AUTO: 315 X10*3/UL (ref 150–450)
RBC # BLD AUTO: 4.77 X10*6/UL (ref 4–5.2)
WBC # BLD AUTO: 7.1 X10*3/UL (ref 4.4–11.3)

## 2024-03-25 PROCEDURE — 85027 COMPLETE CBC AUTOMATED: CPT

## 2024-03-25 PROCEDURE — 99203 OFFICE O/P NEW LOW 30 MIN: CPT | Performed by: NURSE PRACTITIONER

## 2024-03-25 PROCEDURE — 36415 COLL VENOUS BLD VENIPUNCTURE: CPT

## 2024-03-25 RX ORDER — CHOLECALCIFEROL (VITAMIN D3) 25 MCG
TABLET ORAL DAILY
Qty: 180 TABLET | Refills: 0 | Status: SHIPPED | OUTPATIENT
Start: 2024-03-25

## 2024-03-25 NOTE — PREPROCEDURE INSTRUCTIONS
Medication List            Accurate as of March 25, 2024  1:16 PM. Always use your most recent med list.                acetaminophen 650 mg ER tablet  Commonly known as: Tylenol 8 HOUR  Take 1 tablet (650 mg) by mouth if needed for mild pain (1 - 3), moderate pain (4 - 6) or headaches. Take tablet prn     atorvastatin 20 mg tablet  Commonly known as: Lipitor  Take 1 tablet (20 mg) by mouth once daily.     buPROPion  mg 12 hr tablet  Commonly known as: Wellbutrin SR  Take 1 tablet (150 mg) by mouth every 12 hours. Do not crush, chew, or split.     cholecalciferol 25 MCG (1000 UT) tablet  Commonly known as: Vitamin D-3  Take 2 tablets (50 mcg) by mouth once daily.     cyanocobalamin (vitamin B-12) 1,000 mcg tablet extended release  Commonly known as: Vitamin B-12  Take 1 tablet (1,000 mcg) by mouth once daily. As directed     cyclobenzaprine 10 mg tablet  Commonly known as: Flexeril  Take 1 tablet (10 mg) by mouth every 8 hours if needed for muscle spasms.     DULoxetine 60 mg DR capsule  Commonly known as: Cymbalta  Take 1 capsule (60 mg) by mouth once daily.     empagliflozin 25 mg  Commonly known as: Jardiance  Take 1 tablet (25 mg) by mouth once daily. Take 1 tablet daily, start in 2 weeks     fenofibrate 160 mg tablet  Commonly known as: Triglide  Take 1 tablet (160 mg) by mouth once daily.     gabapentin 300 mg capsule  Commonly known as: Neurontin  Take 1 capsule (300 mg) by mouth once daily at bedtime.     hydrOXYzine HCL 25 mg tablet  Commonly known as: Atarax  Take 1 tablet (25 mg) by mouth 3 times a day as needed for anxiety.     lancets 33 gauge misc  1 each once daily. Use as directed     levothyroxine 75 mcg tablet  Commonly known as: Synthroid, Levoxyl  Take 1 tablet (75 mcg) by mouth once daily. As directed     melatonin 10 mg capsule  Take 2 capsules (20 mg) by mouth once daily at bedtime.     metFORMIN  mg 24 hr tablet  Commonly known as: Glucophage-XR  Take 2 tablets (1,000 mg) by  mouth once daily.     mirtazapine 15 mg tablet  Commonly known as: Remeron  TAKE ONE TABLET BY MOUTH NIGHTLY AT BEDTIME. TAKE ADDITIONAL TABLET IF 1 TABLET IS NOT EFFECTIVE     OneTouch Verio test strips strip  Generic drug: blood sugar diagnostic  Inject 1 each under the skin 2 times a day.     semaglutide 2 mg/dose (8 mg/3 mL) pen injector  Inject 2 mg under the skin 1 (one) time per week.                              NPO Instructions:    Do not eat any food after midnight the night before your surgery/procedure.    Additional Instructions:     The Day before Surgery:  Review your medication instructions, stop indicated medications  You will be contacted regarding the time of your arrival to facility and surgery time  Do not eat any food after Midnight  Keep all valuables at home.  After you shower do not use any lotions or perfumes.  Go to admitting upon arrival for procedure, bring an ID and insurance card.  You must have a  to take you home.  Arrival is 8:30 am.  .

## 2024-03-25 NOTE — CPM/PAT H&P
CPM/PAT Evaluation       Name: Norah Marquez (Norah Marquez)  /Age:  y.o.     In-Person       Chief Complaint: Pre-Admission Screening-PAT     HPI-  37 yo female presents for Pre-Admission testing for  Scheduled 3/26/24 Left Endoscopic Carpal Tunnel Release    Past Medical History:   Diagnosis Date    Developmental ovarian cyst     Multiple developmental ovarian cysts    Personal history of other diseases of the musculoskeletal system and connective tissue     History of arthritis    Personal history of other diseases of the musculoskeletal system and connective tissue     History of chronic back pain    Personal history of other diseases of the respiratory system     History of bronchitis    Personal history of other endocrine, nutritional and metabolic disease     History of morbid obesity    Personal history of pneumonia (recurrent)     History of pneumonia       Past Surgical History:   Procedure Laterality Date    CARPAL TUNNEL RELEASE Right 2023    OTHER SURGICAL HISTORY  09/10/2019    Laparoscopy    OTHER SURGICAL HISTORY  09/10/2019    Oophorectomy    OTHER SURGICAL HISTORY  2019    Umbilical hernia repair       Patient  has no history on file for sexual activity.    Family History   Problem Relation Name Age of Onset    Stroke Mother      Diabetes Mother      Breast cancer Mother      Hyperlipidemia Maternal Grandmother      Hypertension Maternal Grandmother         No Known Allergies    Prior to Admission medications    Medication Sig Start Date End Date Taking? Authorizing Provider   acetaminophen (Tylenol 8 HOUR) 650 mg ER tablet Take 1 tablet (650 mg) by mouth if needed for mild pain (1 - 3), moderate pain (4 - 6) or headaches. Take tablet prn 23   Tal Piña MD   atorvastatin (Lipitor) 20 mg tablet Take 1 tablet (20 mg) by mouth once daily. 23   Tal Piña MD   blood sugar diagnostic (OneTouch Verio test strips) strip Inject 1 each under  the skin 2 times a day. 9/25/23   Tal Piña MD   buPROPion SR (Wellbutrin SR) 150 mg 12 hr tablet Take 1 tablet (150 mg) by mouth every 12 hours. Do not crush, chew, or split. 9/25/23   Tal Piña MD   cholecalciferol (Vitamin D-3) 25 MCG (1000 UT) tablet TAKE TWO TABLETS BY MOUTH DAILY 3/25/24   Tal Piña MD   cyanocobalamin, vitamin B-12, (Vitamin B-12) 1,000 mcg tablet extended release Take 1 tablet (1,000 mcg) by mouth once daily. As directed 9/25/23 9/24/24  Tal Piña MD   cyclobenzaprine (Flexeril) 10 mg tablet Take 1 tablet (10 mg) by mouth every 8 hours if needed for muscle spasms. 9/25/23   Tal Piña MD   DULoxetine (Cymbalta) 60 mg DR capsule Take 1 capsule (60 mg) by mouth once daily. 9/25/23 9/24/24  Tal Piña MD   empagliflozin (Jardiance) 25 mg Take 1 tablet (25 mg) by mouth once daily. Take 1 tablet daily, start in 2 weeks 9/25/23   Tal Piña MD   fenofibrate (Triglide) 160 mg tablet Take 1 tablet (160 mg) by mouth once daily. 9/25/23 9/24/24  Tal Piña MD   gabapentin (Neurontin) 300 mg capsule Take 1 capsule (300 mg) by mouth once daily at bedtime. 9/25/23   Tal Piña MD   hydrOXYzine HCL (Atarax) 25 mg tablet Take 1 tablet (25 mg) by mouth 3 times a day as needed for anxiety. 9/25/23   Tal Piña MD   lancets 33 gauge misc 1 each once daily. Use as directed 9/25/23   Tal Piña MD   levothyroxine (Synthroid, Levoxyl) 75 mcg tablet Take 1 tablet (75 mcg) by mouth once daily. As directed 9/25/23   Tal Piña MD   melatonin 10 mg capsule Take 2 capsules (20 mg) by mouth once daily at bedtime. 9/25/23   Tal Piña MD   metFORMIN  mg 24 hr tablet Take 2 tablets (1,000 mg) by mouth once daily. 9/25/23   Tal Piña MD   mirtazapine (Remeron) 15 mg tablet TAKE ONE TABLET BY MOUTH NIGHTLY AT BEDTIME. TAKE ADDITIONAL TABLET IF 1 TABLET IS NOT  EFFECTIVE 2/12/24   Tal Piña MD   semaglutide 2 mg/dose (8 mg/3 mL) pen injector Inject 2 mg under the skin 1 (one) time per week. 3/18/24   Tal Piña MD   cholecalciferol (Vitamin D-3) 25 MCG (1000 UT) tablet Take 2 tablets (50 mcg) by mouth once daily. 9/25/23 3/25/24  Tal Piña MD        PAT ROS     PAT Physical Exam     Airway    Visit Vitals  /77   Pulse 99   Temp 37.1 °C (98.8 °F) (Temporal)   Resp 18       DASI Risk Score    No data to display       Caprini DVT Assessment    No data to display       Modified Frailty Index    No data to display       CHADS2 Stroke Risk  Current as of 7 minutes ago        N/A 3 - 100%: High Risk   2 - 3%: Medium Risk   0 - 2%: Low Risk     Last Change: N/A          This score determines the patient's risk of having a stroke if the patient has atrial fibrillation.        This score is not applicable to this patient. Components are not calculated.          Revised Cardiac Risk Index    No data to display       Apfel Simplified Score    No data to display       Risk Analysis Index Results This Encounter    No data found in the last 1 encounters.         Assessment and Plan:   CBC and BMP Completed 3/14/24 and 3/25/24  EKG completed 4/25/23  H&P Completed by PCP  Pre-Admission Screening Completed.  All questions addressed and answered.  Patient verbalized an understanding.

## 2024-03-26 ENCOUNTER — HOSPITAL ENCOUNTER (OUTPATIENT)
Facility: HOSPITAL | Age: 39
Setting detail: OUTPATIENT SURGERY
Discharge: HOME | End: 2024-03-26
Attending: ORTHOPAEDIC SURGERY | Admitting: ORTHOPAEDIC SURGERY
Payer: COMMERCIAL

## 2024-03-26 ENCOUNTER — PHARMACY VISIT (OUTPATIENT)
Dept: PHARMACY | Facility: CLINIC | Age: 39
End: 2024-03-26
Payer: MEDICARE

## 2024-03-26 ENCOUNTER — ANESTHESIA (OUTPATIENT)
Dept: OPERATING ROOM | Facility: HOSPITAL | Age: 39
End: 2024-03-26
Payer: COMMERCIAL

## 2024-03-26 VITALS
DIASTOLIC BLOOD PRESSURE: 68 MMHG | RESPIRATION RATE: 16 BRPM | TEMPERATURE: 99 F | HEART RATE: 80 BPM | OXYGEN SATURATION: 95 % | SYSTOLIC BLOOD PRESSURE: 109 MMHG

## 2024-03-26 DIAGNOSIS — G56.02 CARPAL TUNNEL SYNDROME ON LEFT: ICD-10-CM

## 2024-03-26 DIAGNOSIS — G56.03 BILATERAL CARPAL TUNNEL SYNDROME: Primary | ICD-10-CM

## 2024-03-26 LAB — GLUCOSE BLD MANUAL STRIP-MCNC: 103 MG/DL (ref 74–99)

## 2024-03-26 PROCEDURE — 2720000007 HC OR 272 NO HCPCS: Performed by: ORTHOPAEDIC SURGERY

## 2024-03-26 PROCEDURE — 82947 ASSAY GLUCOSE BLOOD QUANT: CPT

## 2024-03-26 PROCEDURE — 7100000002 HC RECOVERY ROOM TIME - EACH INCREMENTAL 1 MINUTE: Performed by: ORTHOPAEDIC SURGERY

## 2024-03-26 PROCEDURE — 7100000009 HC PHASE TWO TIME - INITIAL BASE CHARGE: Performed by: ORTHOPAEDIC SURGERY

## 2024-03-26 PROCEDURE — 3700000001 HC GENERAL ANESTHESIA TIME - INITIAL BASE CHARGE: Performed by: ORTHOPAEDIC SURGERY

## 2024-03-26 PROCEDURE — 2500000004 HC RX 250 GENERAL PHARMACY W/ HCPCS (ALT 636 FOR OP/ED): Performed by: ANESTHESIOLOGY

## 2024-03-26 PROCEDURE — 7100000001 HC RECOVERY ROOM TIME - INITIAL BASE CHARGE: Performed by: ORTHOPAEDIC SURGERY

## 2024-03-26 PROCEDURE — 3600000008 HC OR TIME - EACH INCREMENTAL 1 MINUTE - PROCEDURE LEVEL THREE: Performed by: ORTHOPAEDIC SURGERY

## 2024-03-26 PROCEDURE — 3700000002 HC GENERAL ANESTHESIA TIME - EACH INCREMENTAL 1 MINUTE: Performed by: ORTHOPAEDIC SURGERY

## 2024-03-26 PROCEDURE — 2500000005 HC RX 250 GENERAL PHARMACY W/O HCPCS: Performed by: ORTHOPAEDIC SURGERY

## 2024-03-26 PROCEDURE — 2500000004 HC RX 250 GENERAL PHARMACY W/ HCPCS (ALT 636 FOR OP/ED): Performed by: NURSE ANESTHETIST, CERTIFIED REGISTERED

## 2024-03-26 PROCEDURE — 3600000003 HC OR TIME - INITIAL BASE CHARGE - PROCEDURE LEVEL THREE: Performed by: ORTHOPAEDIC SURGERY

## 2024-03-26 PROCEDURE — 7100000010 HC PHASE TWO TIME - EACH INCREMENTAL 1 MINUTE: Performed by: ORTHOPAEDIC SURGERY

## 2024-03-26 PROCEDURE — RXMED WILLOW AMBULATORY MEDICATION CHARGE

## 2024-03-26 PROCEDURE — 29848 WRIST ENDOSCOPY/SURGERY: CPT | Performed by: ORTHOPAEDIC SURGERY

## 2024-03-26 RX ORDER — FENTANYL CITRATE 50 UG/ML
INJECTION, SOLUTION INTRAMUSCULAR; INTRAVENOUS AS NEEDED
Status: DISCONTINUED | OUTPATIENT
Start: 2024-03-26 | End: 2024-03-26

## 2024-03-26 RX ORDER — MORPHINE SULFATE 2 MG/ML
2 INJECTION, SOLUTION INTRAMUSCULAR; INTRAVENOUS EVERY 5 MIN PRN
Status: DISCONTINUED | OUTPATIENT
Start: 2024-03-26 | End: 2024-03-26 | Stop reason: HOSPADM

## 2024-03-26 RX ORDER — LIDOCAINE HYDROCHLORIDE 10 MG/ML
0.1 INJECTION, SOLUTION EPIDURAL; INFILTRATION; INTRACAUDAL; PERINEURAL ONCE
Status: DISCONTINUED | OUTPATIENT
Start: 2024-03-26 | End: 2024-03-26 | Stop reason: HOSPADM

## 2024-03-26 RX ORDER — LIDOCAINE HYDROCHLORIDE AND EPINEPHRINE 20; 10 MG/ML; UG/ML
INJECTION, SOLUTION INFILTRATION; PERINEURAL AS NEEDED
Status: DISCONTINUED | OUTPATIENT
Start: 2024-03-26 | End: 2024-03-26 | Stop reason: HOSPADM

## 2024-03-26 RX ORDER — HYDROCODONE BITARTRATE AND ACETAMINOPHEN 5; 325 MG/1; MG/1
1 TABLET ORAL EVERY 6 HOURS PRN
Qty: 10 TABLET | Refills: 0 | Status: SHIPPED | OUTPATIENT
Start: 2024-03-26 | End: 2024-05-31 | Stop reason: SDUPTHER

## 2024-03-26 RX ORDER — LABETALOL HYDROCHLORIDE 5 MG/ML
5 INJECTION, SOLUTION INTRAVENOUS ONCE AS NEEDED
Status: DISCONTINUED | OUTPATIENT
Start: 2024-03-26 | End: 2024-03-26 | Stop reason: HOSPADM

## 2024-03-26 RX ORDER — SODIUM CHLORIDE, SODIUM LACTATE, POTASSIUM CHLORIDE, CALCIUM CHLORIDE 600; 310; 30; 20 MG/100ML; MG/100ML; MG/100ML; MG/100ML
100 INJECTION, SOLUTION INTRAVENOUS CONTINUOUS
Status: DISCONTINUED | OUTPATIENT
Start: 2024-03-26 | End: 2024-03-26 | Stop reason: HOSPADM

## 2024-03-26 RX ORDER — HYDRALAZINE HYDROCHLORIDE 20 MG/ML
5 INJECTION INTRAMUSCULAR; INTRAVENOUS EVERY 30 MIN PRN
Status: DISCONTINUED | OUTPATIENT
Start: 2024-03-26 | End: 2024-03-26 | Stop reason: HOSPADM

## 2024-03-26 RX ORDER — BUPIVACAINE HCL/EPINEPHRINE 0.5-1:200K
VIAL (ML) INJECTION AS NEEDED
Status: DISCONTINUED | OUTPATIENT
Start: 2024-03-26 | End: 2024-03-26 | Stop reason: HOSPADM

## 2024-03-26 RX ORDER — PROPOFOL 10 MG/ML
INJECTION, EMULSION INTRAVENOUS CONTINUOUS PRN
Status: DISCONTINUED | OUTPATIENT
Start: 2024-03-26 | End: 2024-03-26

## 2024-03-26 RX ORDER — DROPERIDOL 2.5 MG/ML
0.62 INJECTION, SOLUTION INTRAMUSCULAR; INTRAVENOUS ONCE AS NEEDED
Status: DISCONTINUED | OUTPATIENT
Start: 2024-03-26 | End: 2024-03-26 | Stop reason: HOSPADM

## 2024-03-26 RX ORDER — ALBUTEROL SULFATE 0.83 MG/ML
2.5 SOLUTION RESPIRATORY (INHALATION) ONCE AS NEEDED
Status: DISCONTINUED | OUTPATIENT
Start: 2024-03-26 | End: 2024-03-26 | Stop reason: HOSPADM

## 2024-03-26 RX ORDER — FAMOTIDINE 10 MG/ML
20 INJECTION INTRAVENOUS ONCE
Status: COMPLETED | OUTPATIENT
Start: 2024-03-26 | End: 2024-03-26

## 2024-03-26 RX ORDER — ONDANSETRON HYDROCHLORIDE 2 MG/ML
4 INJECTION, SOLUTION INTRAVENOUS ONCE AS NEEDED
Status: DISCONTINUED | OUTPATIENT
Start: 2024-03-26 | End: 2024-03-26 | Stop reason: HOSPADM

## 2024-03-26 RX ORDER — OXYCODONE AND ACETAMINOPHEN 5; 325 MG/1; MG/1
1 TABLET ORAL EVERY 4 HOURS PRN
Status: DISCONTINUED | OUTPATIENT
Start: 2024-03-26 | End: 2024-03-26 | Stop reason: HOSPADM

## 2024-03-26 RX ORDER — MEPERIDINE HYDROCHLORIDE 25 MG/ML
12.5 INJECTION INTRAMUSCULAR; INTRAVENOUS; SUBCUTANEOUS EVERY 10 MIN PRN
Status: DISCONTINUED | OUTPATIENT
Start: 2024-03-26 | End: 2024-03-26 | Stop reason: HOSPADM

## 2024-03-26 RX ORDER — CEFAZOLIN SODIUM 2 G/100ML
INJECTION, SOLUTION INTRAVENOUS AS NEEDED
Status: DISCONTINUED | OUTPATIENT
Start: 2024-03-26 | End: 2024-03-26

## 2024-03-26 RX ORDER — MIDAZOLAM HYDROCHLORIDE 1 MG/ML
INJECTION, SOLUTION INTRAMUSCULAR; INTRAVENOUS AS NEEDED
Status: DISCONTINUED | OUTPATIENT
Start: 2024-03-26 | End: 2024-03-26

## 2024-03-26 RX ORDER — PROPOFOL 10 MG/ML
INJECTION, EMULSION INTRAVENOUS AS NEEDED
Status: DISCONTINUED | OUTPATIENT
Start: 2024-03-26 | End: 2024-03-26

## 2024-03-26 RX ORDER — DIPHENHYDRAMINE HYDROCHLORIDE 50 MG/ML
12.5 INJECTION INTRAMUSCULAR; INTRAVENOUS ONCE AS NEEDED
Status: DISCONTINUED | OUTPATIENT
Start: 2024-03-26 | End: 2024-03-26 | Stop reason: HOSPADM

## 2024-03-26 RX ADMIN — FENTANYL CITRATE 50 MCG: 50 INJECTION INTRAMUSCULAR; INTRAVENOUS at 10:07

## 2024-03-26 RX ADMIN — PROPOFOL 50 MG: 10 INJECTION, EMULSION INTRAVENOUS at 10:10

## 2024-03-26 RX ADMIN — PROPOFOL 50 MG: 10 INJECTION, EMULSION INTRAVENOUS at 10:20

## 2024-03-26 RX ADMIN — FAMOTIDINE 20 MG: 10 INJECTION, SOLUTION INTRAVENOUS at 09:00

## 2024-03-26 RX ADMIN — SODIUM CHLORIDE, POTASSIUM CHLORIDE, SODIUM LACTATE AND CALCIUM CHLORIDE 100 ML/HR: 600; 310; 30; 20 INJECTION, SOLUTION INTRAVENOUS at 09:00

## 2024-03-26 RX ADMIN — FENTANYL CITRATE 50 MCG: 50 INJECTION INTRAMUSCULAR; INTRAVENOUS at 10:10

## 2024-03-26 RX ADMIN — CEFAZOLIN SODIUM 2 G: 2 INJECTION, SOLUTION INTRAVENOUS at 10:07

## 2024-03-26 RX ADMIN — MIDAZOLAM 2 MG: 1 INJECTION INTRAMUSCULAR; INTRAVENOUS at 10:07

## 2024-03-26 RX ADMIN — PROPOFOL 100 MCG/KG/MIN: 10 INJECTION, EMULSION INTRAVENOUS at 10:10

## 2024-03-26 SDOH — HEALTH STABILITY: MENTAL HEALTH: CURRENT SMOKER: 1

## 2024-03-26 ASSESSMENT — PAIN - FUNCTIONAL ASSESSMENT
PAIN_FUNCTIONAL_ASSESSMENT: 0-10

## 2024-03-26 ASSESSMENT — COLUMBIA-SUICIDE SEVERITY RATING SCALE - C-SSRS
6. HAVE YOU EVER DONE ANYTHING, STARTED TO DO ANYTHING, OR PREPARED TO DO ANYTHING TO END YOUR LIFE?: NO
1. IN THE PAST MONTH, HAVE YOU WISHED YOU WERE DEAD OR WISHED YOU COULD GO TO SLEEP AND NOT WAKE UP?: NO
2. HAVE YOU ACTUALLY HAD ANY THOUGHTS OF KILLING YOURSELF?: NO

## 2024-03-26 ASSESSMENT — PAIN SCALES - GENERAL
PAINLEVEL_OUTOF10: 0 - NO PAIN
PAINLEVEL_OUTOF10: 0 - NO PAIN
PAIN_LEVEL: 2
PAINLEVEL_OUTOF10: 0 - NO PAIN

## 2024-03-26 NOTE — ANESTHESIA PREPROCEDURE EVALUATION
Patient: Norah Marquez    Procedure Information       Date/Time: 03/26/24 1000    Procedure: LEFT ENDOSCOPIC CARPAL TUNNEL RELEASE MAC/LOCAL (Left: Wrist) - LOCAL/MAC, 15 MINUTES, 2 GRAMS ANCEF, NO SPECIAL EQUIPMENT    Location: [facility] 07 / Virtual [facility]    Surgeons: Dillon Aranda, DO            Relevant Problems   Anesthesia (within normal limits)      Cardiovascular   (+) Cardiac arrhythmia   (+) Mixed hyperlipidemia   (+) Peripheral vascular disease (CMS/HCC)      Endocrine   (+) Hypothyroidism in adult   (+) Obesity   (+) Obesity, morbid (more than 100 lbs over ideal weight or BMI > 40) (CMS/HCC)   (+) Type 2 diabetes mellitus with diabetic polyneuropathy, without long-term current use of insulin (CMS/HCC)      /Renal   (+) Acute UTI   (+) Solitary kidney, congenital      Neuro/Psych   (+) Anxiety   (+) Bilateral carpal tunnel syndrome   (+) Carpal tunnel syndrome on left   (+) Carpal tunnel syndrome, bilateral upper limbs   (+) Chronic sciatica of left side   (+) Depressive disorder      Pulmonary   (+) Asthma   (+) HCAP (healthcare-associated pneumonia)   (+) DRISS (obstructive sleep apnea)   (+) Obstructive sleep apnea      Musculoskeletal   (+) Bilateral carpal tunnel syndrome   (+) Carpal tunnel syndrome on left   (+) Carpal tunnel syndrome, bilateral upper limbs   (+) Primary osteoarthritis of left hip      Infectious Disease   (+) Acute UTI   (+) HCAP (healthcare-associated pneumonia)   (+) Infection of tooth       Clinical information reviewed:   Tobacco  Allergies  Meds   Med Hx  Surg Hx  OB Status  Fam Hx  Soc   Hx        NPO Detail:  No data recorded     Physical Exam    Airway  Mallampati: III  TM distance: >3 FB  Neck ROM: full     Cardiovascular - normal exam     Dental - normal exam     Pulmonary - normal exam     Abdominal   (+) obese         Anesthesia Plan    History of general anesthesia?: yes  History of complications of general anesthesia?: no    ASA 3     MAC     The patient is  a current smoker.  Patient was not previously instructed to abstain from smoking on day of procedure.  Patient did not smoke on day of procedure.    intravenous induction   Postoperative administration of opioids is intended.  Anesthetic plan and risks discussed with patient.  Use of blood products discussed with patient who.    Plan discussed with CRNA.

## 2024-03-26 NOTE — OP NOTE
ORTHOPEDIC OPERATIVE NOTE    Name: Norah Marquez  : 1985  Surgeon: Jet Aranda DO  Facility: [facility]  Date of Surgery: 24  ORTHOPEDIC OPERATIVE NOTE    SURGEON:     Jet Aranda DO  PRE OP DIAGNOSIS:  Carpal Tunnel Syndrome Left Wrist  POST OP DIAGNOSIS:  Same  PROCEDURE:   Endoscopic Carpal Tunnel Release Left Wrist    ANESTHESIA:  Local with sedation  ASSISTANT:    SA Cote  COMPLICATIONS:   None.  CONDITION:    Satisfactory to PACU.  BLOOD LOSS: Minimal    INDICATION FOR PROCEDURE: The patient is a 38 y.o. -year-old female who has failed nonsurgical management for left carpal tunnel syndrome including night splints. They had an extensive workup consisting of signs, symptoms, and clinical history of EMG nerve conduction study consistent with left carpal syndrome.  The patient was seen in the office, fully informed risks and benefits of the procedure, and elected to undergo the procedure.    PROCEDURE IN DETAIL: The patient was seen and consented preoperatively with side and site of surgery appropriately marked. The patient was taken back to the operative suite, placed supine on the operative table, and placed on monitor for the duration of case. The patient was administered sedation and monitored throughout the entire surgery by Department of Anesthesia. While sedated, the patient was administered 5 cc of mixed marcaine and lidocaine to the volar aspect of wrist and palm for local anesthesia. The operative upper extremity was then sterilely prepped and draped in sterile orthopedic fashion, elevated with an Esmarch, and tourniquet inflated to 250 mmHg for the duration of case, and time-out was performed confirming site of surgery and surgery to be performed.    A 1-cm transverse incision was made to the ulnar aspect of the palmaris longus at proximal wrist crease. Skin and underlying tissues were sharply dissected down. Hemostasis maintained with bipolar electrocauterization.  Distally based flap of the fascia overlying the median nerve was then released, and under direct visualization, proximal fascia was released with Littler scissors. The elevator and dilator were then placed in the carpal tunnel with appropriate ease of passage and corrugated feel of the undersurface of transcarpal ligament. The endoscope was then placed under direct visualization. The transverse carpal ligament was released in its entirety, confirmed both visually as well as by utilizing endoscope as a probe, which freely passed following release. The nerve was evaluated. No evidence of lesion or adhesion was present.    The wound was irrigated with sterile saline, closed with 5-0 nylon suture. Sterile dressing was then placed of Xeroform and 4x4s affixed with Kerlix and Ace wrap. Tourniquet was deflated, and the patient was taken to PACU in satisfactory condition.    Electronically signed  Jet Aranda DO

## 2024-03-26 NOTE — ANESTHESIA POSTPROCEDURE EVALUATION
Patient: Norah Marquez    Procedure Summary       Date: 03/26/24 Room / Location: [facility] / [facility]    Anesthesia Start: 1007 Anesthesia Stop: 1026    Procedure: LEFT ENDOSCOPIC CARPAL TUNNEL RELEASE MAC/LOCAL (Left: Wrist) Diagnosis:       Carpal tunnel syndrome on left      (Carpal tunnel syndrome on left [G56.02])    Surgeons: Dillon Aranda DO Responsible Provider: LUCILA Fine    Anesthesia Type: MAC ASA Status: 3            Anesthesia Type: MAC    Vitals Value Taken Time   BP 99/64 03/26/24 1100   Temp 37.2 °C (99 °F) 03/26/24 1100   Pulse 80 03/26/24 1100   Resp 15 03/26/24 1100   SpO2 94 % 03/26/24 1100       Anesthesia Post Evaluation    Patient location during evaluation: PACU  Patient participation: complete - patient participated  Level of consciousness: awake  Pain score: 2  Pain management: adequate  Airway patency: patent  Cardiovascular status: acceptable  Respiratory status: acceptable  Hydration status: acceptable  Postoperative Nausea and Vomiting: none    There were no known notable events for this encounter.

## 2024-03-26 NOTE — DISCHARGE INSTRUCTIONS
[facility]  [phone]   Fax: [phone] [address] [facility] [address]   [address] [facility] -  [address] [place] [id number]    Upper Extremity - Endoscopic Carpal Tunnel Release    1. Dressing    You have a soft bandage over the operative site.  DO NOT GET DRESSING WET! Once at home, if your dressing feels too tight or mistakenly gets wet, please contact the office. This bandage can be removed in 48 hours and replaced in with Band-Aids as needed. After 48 hours you may wash your hands and shower, but no submerging.    If your sutures are visible (black strings), they will be removed about 10-14 days after surgery.  You should make an appointment to see your physician 10-14 after surgery.    2. Activity     Most people underestimate the length of time required to fully recover from surgery.  It is recommended you take some pain medication the evening following your surgery so when the local anesthetic wears off (in the middle of the night), you are not in severe pain.   With pain medication, start at the lowest dose that controls your pain.       After the first 1-3 days, we encourage you to balance your activity between ambulation, sitting in a chair, and resting in bed with your arm elevated. Let swelling and pain be your guide to activity, you should avoid prolonged (over 20 minutes) standing or sitting. In general, limit your activities to home for the first 1-3 days.    3. Pain    You will be discharged to home with a prescription for oral pain medication. A most important factor in pain control is rest and elevation. Ice may also be applied to the operative site for 30 minute intervals. Please use a waterproof bag for ice or cold packs.  Again, as you feel the numbness resolving and some onset of discomfort, please take pain medication, don't wait till full resolution of block.   Try to use the lowest dose of pain medication that controls your pain.       The pain medication may cause constipation. Drink plenty of fluids, eat fruits and vegetables. You may use an over the counter laxative or stool softener if necessary. Take pain medication with some food in your stomach, as prescribed by your pharmacist.     4. Elevation     Elevation of the operative extremity is critical. Elevation reduces swelling and minimizes pain. Less swelling is associated with a lower infectious rate, fewer wound complications, less post-operative stiffness, and more rapid recovery of function. To keep the swelling down, your hand must be kept above the level of your heart.

## 2024-04-02 ENCOUNTER — PATIENT MESSAGE (OUTPATIENT)
Dept: PRIMARY CARE | Facility: CLINIC | Age: 39
End: 2024-04-02
Payer: COMMERCIAL

## 2024-04-02 DIAGNOSIS — E66.01 CLASS 3 SEVERE OBESITY DUE TO EXCESS CALORIES WITH BODY MASS INDEX (BMI) OF 45.0 TO 49.9 IN ADULT, UNSPECIFIED WHETHER SERIOUS COMORBIDITY PRESENT (MULTI): ICD-10-CM

## 2024-04-02 DIAGNOSIS — E11.42 TYPE 2 DIABETES MELLITUS WITH DIABETIC POLYNEUROPATHY, WITHOUT LONG-TERM CURRENT USE OF INSULIN (MULTI): Primary | ICD-10-CM

## 2024-04-03 DIAGNOSIS — G47.33 OSA (OBSTRUCTIVE SLEEP APNEA): ICD-10-CM

## 2024-04-03 DIAGNOSIS — G47.01 INSOMNIA DUE TO MEDICAL CONDITION: ICD-10-CM

## 2024-04-03 DIAGNOSIS — F32.A DEPRESSIVE DISORDER: ICD-10-CM

## 2024-04-03 DIAGNOSIS — E11.65 POORLY CONTROLLED TYPE 2 DIABETES MELLITUS (MULTI): ICD-10-CM

## 2024-04-03 RX ORDER — TIRZEPATIDE 10 MG/.5ML
10 INJECTION, SOLUTION SUBCUTANEOUS
Qty: 2 ML | Refills: 3 | Status: SHIPPED | OUTPATIENT
Start: 2024-04-03

## 2024-04-04 RX ORDER — MIRTAZAPINE 15 MG/1
TABLET, FILM COATED ORAL
Qty: 90 TABLET | Refills: 1 | Status: SHIPPED | OUTPATIENT
Start: 2024-04-04

## 2024-04-04 RX ORDER — METFORMIN HYDROCHLORIDE 500 MG/1
1000 TABLET, EXTENDED RELEASE ORAL DAILY
Qty: 180 TABLET | Refills: 1 | Status: SHIPPED | OUTPATIENT
Start: 2024-04-04

## 2024-04-08 ENCOUNTER — APPOINTMENT (OUTPATIENT)
Dept: ORTHOPEDIC SURGERY | Facility: CLINIC | Age: 39
End: 2024-04-08
Payer: COMMERCIAL

## 2024-04-11 ENCOUNTER — OFFICE VISIT (OUTPATIENT)
Dept: ORTHOPEDIC SURGERY | Facility: CLINIC | Age: 39
End: 2024-04-11
Payer: COMMERCIAL

## 2024-04-11 DIAGNOSIS — G56.02 CARPAL TUNNEL SYNDROME ON LEFT: Primary | ICD-10-CM

## 2024-04-11 PROCEDURE — 3044F HG A1C LEVEL LT 7.0%: CPT | Performed by: ORTHOPAEDIC SURGERY

## 2024-04-11 PROCEDURE — 99024 POSTOP FOLLOW-UP VISIT: CPT | Performed by: ORTHOPAEDIC SURGERY

## 2024-04-11 PROCEDURE — 3048F LDL-C <100 MG/DL: CPT | Performed by: ORTHOPAEDIC SURGERY

## 2024-04-11 PROCEDURE — 3008F BODY MASS INDEX DOCD: CPT | Performed by: ORTHOPAEDIC SURGERY

## 2024-04-11 NOTE — PROGRESS NOTES
38 y.o. female presents today for for follow up after endoscopic carpal tunnel release. The patient reports doing well, no issues. The DOS was 2 weeks ago. Pain is controlled. Patient denies numbness and tingling.  Night symptoms resolved.  Would like to go back to work    Review of Systems    Constitutional: see HPI, no fever, no chills, not feeling tired, no significant weight gain or weight loss.   Eyes: No vision changes  ENT: no nosebleeds.   Cardiovascular: no chest pain.   Respiratory: no shortness of breath and no cough.   Gastrointestinal: no abdominal pain, no nausea, no vomiting and no diarrhea.   Musculoskeletal: per HPI  Neurological: no headache, no gait disturbances  Psychiatric: no depression and no sleep disturbances.   Endocrine: no muscle weakness and no muscle cramps.   Hematologic/Lymphatic: no swollen glands and no tendency for easy bruising or excessive swelling.     Patient's past medical history, past surgical history, allergies, and medications have been reviewed unless otherwise noted in the chart.     Hand/Wrist Post-Op Exam  Inspection:  no evidence of infection, no erythema, Palpation:  compartments are soft, Range of Motion:  F/E 80/80, S/P 90/90 Finger ROM Full extension, full flexion Stability:  stable Strength:  5/5 F/E, 5/5 Adduction/Abduction 5/5 Opposition Skin:  incision site clean, dry and intact, healing without complication, Vascular:  capillary refill <2 seconds distally, Sensation:  intact to light touch distally.    Constitutional   General appearance: Alert and in no acute distress. Well developed, well nourished.    Eyes   External Eye, Conjunctiva and lids: Normal external exam - pupils were equal in size, round, reactive to light (PERRL) with normal accommodation and extraocular movements intact (EOMI).   Ears, Nose, Mouth, and Throat   Hearing: Normal.   Neck   Neck: No neck mass was observed. Supple.   Pulmonary   Respiratory effort: No respiratory distress.    Cardiovascular   Examination of extremities: No peripheral edema.   Psychiatric   Judgment and insight: Intact.   Orientation to person, place, and time: Alert and oriented x 3.       Mood and affect: Normal.      2 weeks status post left endoscopic carpal tunnel release  Based on the history, physical exam and imaging studies above, the patient's presentation is consistent with the above diagnosis.  I had a long discussion with the patient regarding their presentation and the treatment options.    We again discussed her treatment options going forward along with their associated risks and benefits. After thorough discussion, the patient has elected to proceed with postoperative care. All questions were answered to the patients satisfaction who seems satisfied with the plan.  They will call the office with any questions/concerns.     Sutures removed  Steris  Vitamin E cream prn to scar tissue  Activities as tolerated  FU 4-6 weeks prn - no XR

## 2024-04-11 NOTE — LETTER
April 11, 2024     Patient: Norah Marquez   YOB: 1985   Date of Visit: 4/11/2024       To Whom It May Concern:    It is my medical opinion that Norah Marquez may return to work on 04/13/2024 - can't push, pull or lift anything more than 10lbs with the left arm for 1 month then after that can return without restrictions .    If you have any questions or concerns, please don't hesitate to call.         Sincerely,        Dillon Aarnda DO    CC: No Recipients

## 2024-04-14 DIAGNOSIS — E78.2 MIXED HYPERLIPIDEMIA: ICD-10-CM

## 2024-04-15 RX ORDER — ATORVASTATIN CALCIUM 20 MG/1
20 TABLET, FILM COATED ORAL DAILY
Qty: 90 TABLET | Refills: 0 | Status: SHIPPED | OUTPATIENT
Start: 2024-04-15

## 2024-05-02 DIAGNOSIS — E11.42 TYPE 2 DIABETES MELLITUS WITH DIABETIC POLYNEUROPATHY, WITHOUT LONG-TERM CURRENT USE OF INSULIN (MULTI): Primary | ICD-10-CM

## 2024-05-02 DIAGNOSIS — E11.9 TYPE 2 DIABETES MELLITUS WITHOUT COMPLICATION, WITHOUT LONG-TERM CURRENT USE OF INSULIN (MULTI): ICD-10-CM

## 2024-05-31 ENCOUNTER — HOSPITAL ENCOUNTER (OUTPATIENT)
Dept: RADIOLOGY | Facility: CLINIC | Age: 39
Discharge: HOME | End: 2024-05-31
Payer: COMMERCIAL

## 2024-05-31 ENCOUNTER — OFFICE VISIT (OUTPATIENT)
Dept: PRIMARY CARE | Facility: CLINIC | Age: 39
End: 2024-05-31
Payer: COMMERCIAL

## 2024-05-31 VITALS
BODY MASS INDEX: 46.53 KG/M2 | RESPIRATION RATE: 18 BRPM | HEIGHT: 60 IN | DIASTOLIC BLOOD PRESSURE: 78 MMHG | TEMPERATURE: 97 F | HEART RATE: 102 BPM | SYSTOLIC BLOOD PRESSURE: 122 MMHG | OXYGEN SATURATION: 97 % | WEIGHT: 237 LBS

## 2024-05-31 DIAGNOSIS — M25.572 ACUTE LEFT ANKLE PAIN: Primary | ICD-10-CM

## 2024-05-31 DIAGNOSIS — M25.572 ACUTE LEFT ANKLE PAIN: ICD-10-CM

## 2024-05-31 PROBLEM — J01.00 ACUTE MAXILLARY SINUSITIS: Status: RESOLVED | Noted: 2023-02-15 | Resolved: 2024-05-31

## 2024-05-31 PROBLEM — H66.91 RIGHT OTITIS MEDIA: Status: RESOLVED | Noted: 2023-02-15 | Resolved: 2024-05-31

## 2024-05-31 PROBLEM — K04.7 INFECTION OF TOOTH: Status: RESOLVED | Noted: 2023-02-15 | Resolved: 2024-05-31

## 2024-05-31 PROBLEM — H66.92 LEFT OTITIS MEDIA: Status: RESOLVED | Noted: 2023-02-15 | Resolved: 2024-05-31

## 2024-05-31 PROCEDURE — 3044F HG A1C LEVEL LT 7.0%: CPT

## 2024-05-31 PROCEDURE — 73610 X-RAY EXAM OF ANKLE: CPT | Mod: LEFT SIDE | Performed by: RADIOLOGY

## 2024-05-31 PROCEDURE — 73610 X-RAY EXAM OF ANKLE: CPT | Mod: LT

## 2024-05-31 PROCEDURE — 99213 OFFICE O/P EST LOW 20 MIN: CPT

## 2024-05-31 PROCEDURE — 3048F LDL-C <100 MG/DL: CPT

## 2024-05-31 PROCEDURE — 3078F DIAST BP <80 MM HG: CPT

## 2024-05-31 PROCEDURE — 3074F SYST BP LT 130 MM HG: CPT

## 2024-05-31 PROCEDURE — 3008F BODY MASS INDEX DOCD: CPT

## 2024-05-31 SDOH — ECONOMIC STABILITY: FOOD INSECURITY: WITHIN THE PAST 12 MONTHS, YOU WORRIED THAT YOUR FOOD WOULD RUN OUT BEFORE YOU GOT MONEY TO BUY MORE.: NEVER TRUE

## 2024-05-31 SDOH — ECONOMIC STABILITY: FOOD INSECURITY: WITHIN THE PAST 12 MONTHS, THE FOOD YOU BOUGHT JUST DIDN'T LAST AND YOU DIDN'T HAVE MONEY TO GET MORE.: NEVER TRUE

## 2024-05-31 ASSESSMENT — PATIENT HEALTH QUESTIONNAIRE - PHQ9
2. FEELING DOWN, DEPRESSED OR HOPELESS: NOT AT ALL
1. LITTLE INTEREST OR PLEASURE IN DOING THINGS: NOT AT ALL
SUM OF ALL RESPONSES TO PHQ9 QUESTIONS 1 & 2: 0

## 2024-05-31 ASSESSMENT — PAIN SCALES - GENERAL: PAINLEVEL: 10-WORST PAIN EVER

## 2024-05-31 NOTE — PATIENT INSTRUCTIONS
Thank you for coming to see me today.  If you have any questions or concerns following our visit, please contact the office.  Phone: [phone]    Follow up with Dr. Piña as previously scheduled    1)  Get Xrays of your ankle today on your way out    2) Wear over the counter ankle support brace to help with stability and pain    3) I am referring you to ortho foot/ankle - please call [phone] to schedule an appointment or schedule at  on your way out. Schedule this appointment if conservative measures don't help in 3-5 days, symptoms worsen or there is fracture present on Xray    4)  Ice your ankle, 15 minutes on, 15 minutes off 2-3 times a day while symptomatic     5) OK to start ibuprofen 600mg three times daily to help with food and inflammation, take for 7-10 days

## 2024-05-31 NOTE — PROGRESS NOTES
Subjective   Patient ID: Norah Marquez is a 39 y.o. female who presents for evaluation of left foot discomfort.    Left foot bothering her for 2-3 weeks, sensitive to wearing shoes and socks. Describes discomfort as burning, throbbing. Symptoms vary. Rates discomfort as 7-8/10. Has tried iced which doesn't help. Acetaminophen, gabapentin, muscle relaxer not helpful.   Father with history of gout. Identifies walking as a provokative factor, trying to bear weight more on her left foot.   Has been trying to manage things at home alone.      recently passed away, she has been managing household on her own with two dogs. Was previously exercising regularly, used to walk 1-1.5 miles per day.     Review of Systems     Current Outpatient Medications   Medication Sig Dispense Refill    atorvastatin (Lipitor) 20 mg tablet TAKE ONE TABLET BY MOUTH EVERY DAY 90 tablet 0    blood sugar diagnostic (OneTouch Verio test strips) strip Inject 1 each under the skin 2 times a day. 100 strip 3    buPROPion SR (Wellbutrin SR) 150 mg 12 hr tablet Take 1 tablet (150 mg) by mouth every 12 hours. Do not crush, chew, or split. 180 tablet 1    cholecalciferol (Vitamin D-3) 25 MCG (1000 UT) tablet TAKE TWO TABLETS BY MOUTH DAILY 180 tablet 0    cyanocobalamin, vitamin B-12, (Vitamin B-12) 1,000 mcg tablet extended release Take 1 tablet (1,000 mcg) by mouth once daily. As directed 90 tablet 3    DULoxetine (Cymbalta) 60 mg DR capsule Take 1 capsule (60 mg) by mouth once daily. 90 capsule 3    empagliflozin (Jardiance) 25 mg Take 1 tablet (25 mg) by mouth once daily. Take 1 tablet daily, start in 2 weeks 90 tablet 3    fenofibrate (Triglide) 160 mg tablet Take 1 tablet (160 mg) by mouth once daily. 90 tablet 1    gabapentin (Neurontin) 300 mg capsule Take 1 capsule (300 mg) by mouth once daily at bedtime. 90 capsule 1    hydrOXYzine HCL (Atarax) 25 mg tablet Take 1 tablet (25 mg) by mouth 3 times a day as needed for anxiety. 90 tablet 1     lancets 33 gauge misc 1 each once daily. Use as directed 100 each 3    levothyroxine (Synthroid, Levoxyl) 75 mcg tablet Take 1 tablet (75 mcg) by mouth once daily. As directed 90 tablet 1    melatonin 10 mg capsule Take 2 capsules (20 mg) by mouth once daily at bedtime. 180 capsule 1    metFORMIN  mg 24 hr tablet TAKE TWO TABLETS BY MOUTH DAILY 180 tablet 1    mirtazapine (Remeron) 15 mg tablet TAKE 1 TABLET BY MOUTH NIGHTLY AT BEDTIME. TAKE ADDITIONAL TABLET IF 1 TABLET IS NOT EFFECTIVE 90 tablet 1    tirzepatide (Mounjaro) 10 mg/0.5 mL pen injector Inject 10 mg under the skin 1 (one) time per week. Stop Ozempic when starting Mounjaro, take the first dose 7 days after last Ozempic dose 2 mL 3     No current facility-administered medications for this visit.     Past Surgical History:   Procedure Laterality Date    CARPAL TUNNEL RELEASE Right 04/25/2023    CARPAL TUNNEL RELEASE Left 03/2024    OTHER SURGICAL HISTORY  09/10/2019    Laparoscopy    OTHER SURGICAL HISTORY  09/10/2019    Oophorectomy    OTHER SURGICAL HISTORY  12/11/2019    Umbilical hernia repair     Family History   Problem Relation Name Age of Onset    Stroke Mother      Diabetes Mother      Breast cancer Mother      Hyperlipidemia Maternal Grandmother      Hypertension Maternal Grandmother        Social History     Tobacco Use    Smoking status: Every Day     Current packs/day: 0.25     Types: Cigarettes    Smokeless tobacco: Never   Vaping Use    Vaping status: Never Used   Substance Use Topics    Alcohol use: Yes     Alcohol/week: 2.0 - 3.0 standard drinks of alcohol     Types: 2 - 3 Standard drinks or equivalent per week     Comment: socially    Drug use: Never        Objective     Visit Vitals  /78 (BP Location: Left arm, Patient Position: Sitting, BP Cuff Size: Large adult)   Pulse 102   Temp 36.1 °C (97 °F)   Resp 18   Ht 1.524 m (5')   Wt 108 kg (237 lb)   SpO2 97%   BMI 46.29 kg/m²   OB Status Hysterectomy   Smoking Status Every  Day   BSA 2.14 m²        Physical Exam  Musculoskeletal:      Left ankle: Swelling present. Tenderness present over the ATF ligament.      Comments: Localized swelling over ATF ligament           Assessment/Plan   Problem List Items Addressed This Visit    None  Visit Diagnoses       Acute left ankle pain    -  Primary    Left ankle pain with tenderness over ATF ligament  Start ibuprofen 600 TID x 7-10d  Ice region, support brace  Ref to ortho PRN-placed  Xray to r/o fx/stress fx    Relevant Orders    XR ankle left 3+ views    Referral to Orthopaedic Surgery            All pertinent lab work and results were reviewed with patient.     Follow up with Dr. Piña as previously scheduled    Cheyanne Pham, ANDRÉS-CNS

## 2024-06-11 DIAGNOSIS — M54.50 LOW BACK PAIN, UNSPECIFIED BACK PAIN LATERALITY, UNSPECIFIED CHRONICITY, UNSPECIFIED WHETHER SCIATICA PRESENT: ICD-10-CM

## 2024-06-13 RX ORDER — GABAPENTIN 300 MG/1
300 CAPSULE ORAL NIGHTLY
Qty: 90 CAPSULE | Refills: 1 | Status: SHIPPED | OUTPATIENT
Start: 2024-06-13

## 2024-06-14 DIAGNOSIS — F32.A DEPRESSIVE DISORDER: ICD-10-CM

## 2024-06-14 RX ORDER — DULOXETIN HYDROCHLORIDE 60 MG/1
60 CAPSULE, DELAYED RELEASE ORAL DAILY
Qty: 90 CAPSULE | Refills: 0 | Status: SHIPPED | OUTPATIENT
Start: 2024-06-14

## 2024-06-14 NOTE — TELEPHONE ENCOUNTER
Pt called in and stated she doesn't have a refill on this rx. I called [facility] pharmacy and spoke with the pharmacist. He stated pt's rx for Duloxetine that had 1 year of refills on 9/25/2023 was transferred to a [facility] in [place] and so pt doesn't actually have another refill. He said he sent this surescript over instead. I called patient back, pt confirmed that this was correct and stated she is out. Please advise.

## 2024-06-25 DIAGNOSIS — G47.01 INSOMNIA DUE TO MEDICAL CONDITION: ICD-10-CM

## 2024-06-25 DIAGNOSIS — G47.33 OSA (OBSTRUCTIVE SLEEP APNEA): ICD-10-CM

## 2024-06-25 DIAGNOSIS — F32.A DEPRESSIVE DISORDER: ICD-10-CM

## 2024-06-26 RX ORDER — MIRTAZAPINE 15 MG/1
TABLET, FILM COATED ORAL
Qty: 90 TABLET | Refills: 0 | Status: SHIPPED | OUTPATIENT
Start: 2024-06-26

## 2024-07-09 ENCOUNTER — PATIENT MESSAGE (OUTPATIENT)
Dept: PRIMARY CARE | Facility: CLINIC | Age: 39
End: 2024-07-09
Payer: COMMERCIAL

## 2024-07-09 DIAGNOSIS — Z79.899 MEDICATION MANAGEMENT: Primary | ICD-10-CM

## 2024-07-09 DIAGNOSIS — E11.42 TYPE 2 DIABETES MELLITUS WITH DIABETIC POLYNEUROPATHY, WITHOUT LONG-TERM CURRENT USE OF INSULIN (MULTI): ICD-10-CM

## 2024-07-09 DIAGNOSIS — E66.01 OBESITY, MORBID (MORE THAN 100 LBS OVER IDEAL WEIGHT OR BMI > 40) (MULTI): ICD-10-CM

## 2024-07-12 RX ORDER — SEMAGLUTIDE 2.68 MG/ML
2 INJECTION, SOLUTION SUBCUTANEOUS
Qty: 9 ML | Refills: 3 | Status: SHIPPED | OUTPATIENT
Start: 2024-07-12

## 2024-07-15 ENCOUNTER — APPOINTMENT (OUTPATIENT)
Dept: ORTHOPEDIC SURGERY | Facility: CLINIC | Age: 39
End: 2024-07-15
Payer: COMMERCIAL

## 2024-07-24 ENCOUNTER — OFFICE VISIT (OUTPATIENT)
Dept: ORTHOPEDIC SURGERY | Facility: CLINIC | Age: 39
End: 2024-07-24
Payer: COMMERCIAL

## 2024-07-24 ENCOUNTER — APPOINTMENT (OUTPATIENT)
Dept: PRIMARY CARE | Facility: CLINIC | Age: 39
End: 2024-07-24
Payer: COMMERCIAL

## 2024-07-24 VITALS — BODY MASS INDEX: 45.75 KG/M2 | HEIGHT: 60 IN | WEIGHT: 233 LBS

## 2024-07-24 DIAGNOSIS — M25.572 ACUTE LEFT ANKLE PAIN: ICD-10-CM

## 2024-07-24 PROCEDURE — 3044F HG A1C LEVEL LT 7.0%: CPT | Performed by: SPECIALIST

## 2024-07-24 PROCEDURE — 3008F BODY MASS INDEX DOCD: CPT | Performed by: SPECIALIST

## 2024-07-24 PROCEDURE — 3048F LDL-C <100 MG/DL: CPT | Performed by: SPECIALIST

## 2024-07-24 PROCEDURE — 99214 OFFICE O/P EST MOD 30 MIN: CPT | Performed by: SPECIALIST

## 2024-07-24 NOTE — PROGRESS NOTES
NPV left ankle pain   Xrays done 5/31/24  NO INJURY NO SURGERY   Pt states the pain comes and goes she has started exercising and feels as if that's causing the pain    Exam: Left foot and ankle without external signs of trauma, no erythema no ecchymosis.  Minimal swelling.  She has diffuse hindfoot and ankle pain but worst area of pain is in the left sinus Tarsi.  And this pain is exacerbated with passive subtalar motion which appears supple.  No instability of the ankle or subtalar regions.  Dermis intact neurovascular intact.    Reviewed prior radiographs of the left ankle which identifies no obvious bone or joint abnormality.    Assessment plan: Left sinus Tarsi syndrome/hindfoot overuse.  She is starting to feel better recommended low impact fitness routine aggressive stretching she does have a tight Achilles on exam and light weight bracing over-the-counter.

## 2024-07-28 DIAGNOSIS — E78.2 MIXED HYPERLIPIDEMIA: ICD-10-CM

## 2024-07-30 DIAGNOSIS — F41.9 ANXIETY: ICD-10-CM

## 2024-07-30 RX ORDER — BUPROPION HYDROCHLORIDE 150 MG/1
TABLET, EXTENDED RELEASE ORAL
Qty: 180 TABLET | Refills: 0 | Status: SHIPPED | OUTPATIENT
Start: 2024-07-30

## 2024-07-31 RX ORDER — ATORVASTATIN CALCIUM 20 MG/1
20 TABLET, FILM COATED ORAL DAILY
Qty: 90 TABLET | Refills: 0 | Status: SHIPPED | OUTPATIENT
Start: 2024-07-31

## 2024-08-07 ENCOUNTER — LAB (OUTPATIENT)
Dept: LAB | Facility: LAB | Age: 39
End: 2024-08-07
Payer: COMMERCIAL

## 2024-08-07 DIAGNOSIS — E11.42 TYPE 2 DIABETES MELLITUS WITH DIABETIC POLYNEUROPATHY, WITHOUT LONG-TERM CURRENT USE OF INSULIN (MULTI): ICD-10-CM

## 2024-08-07 DIAGNOSIS — E03.9 HYPOTHYROIDISM IN ADULT: ICD-10-CM

## 2024-08-07 DIAGNOSIS — F32.A DEPRESSIVE DISORDER: ICD-10-CM

## 2024-08-07 DIAGNOSIS — E53.8 VITAMIN B12 DEFICIENCY: ICD-10-CM

## 2024-08-07 DIAGNOSIS — J96.01 ACUTE RESPIRATORY FAILURE WITH HYPOXIA (MULTI): ICD-10-CM

## 2024-08-07 DIAGNOSIS — E78.2 MIXED HYPERLIPIDEMIA: ICD-10-CM

## 2024-08-07 DIAGNOSIS — E66.9 OBESITY, UNSPECIFIED CLASSIFICATION, UNSPECIFIED OBESITY TYPE, UNSPECIFIED WHETHER SERIOUS COMORBIDITY PRESENT: ICD-10-CM

## 2024-08-07 DIAGNOSIS — Z79.899 MEDICATION MANAGEMENT: ICD-10-CM

## 2024-08-07 DIAGNOSIS — E11.9 TYPE 2 DIABETES MELLITUS WITHOUT COMPLICATION, WITHOUT LONG-TERM CURRENT USE OF INSULIN (MULTI): ICD-10-CM

## 2024-08-07 DIAGNOSIS — J45.909 MILD ASTHMA WITHOUT COMPLICATION, UNSPECIFIED WHETHER PERSISTENT (HHS-HCC): ICD-10-CM

## 2024-08-07 DIAGNOSIS — E55.9 VITAMIN D INSUFFICIENCY: ICD-10-CM

## 2024-08-07 DIAGNOSIS — F17.210 CIGARETTE SMOKER: ICD-10-CM

## 2024-08-07 LAB
25(OH)D3 SERPL-MCNC: 31 NG/ML (ref 30–100)
ALBUMIN SERPL BCP-MCNC: 4.2 G/DL (ref 3.4–5)
ALP SERPL-CCNC: 48 U/L (ref 33–110)
ALT SERPL W P-5'-P-CCNC: 17 U/L (ref 7–45)
ANION GAP SERPL CALC-SCNC: 10 MMOL/L (ref 10–20)
AST SERPL W P-5'-P-CCNC: 16 U/L (ref 9–39)
BASOPHILS # BLD AUTO: 0.05 X10*3/UL (ref 0–0.1)
BASOPHILS NFR BLD AUTO: 0.7 %
BILIRUB SERPL-MCNC: 0.3 MG/DL (ref 0–1.2)
BUN SERPL-MCNC: 13 MG/DL (ref 6–23)
CALCIUM SERPL-MCNC: 8.9 MG/DL (ref 8.6–10.3)
CHLORIDE SERPL-SCNC: 109 MMOL/L (ref 98–107)
CHOLEST SERPL-MCNC: 159 MG/DL (ref 0–199)
CHOLESTEROL/HDL RATIO: 4.3
CO2 SERPL-SCNC: 24 MMOL/L (ref 21–32)
CREAT SERPL-MCNC: 0.76 MG/DL (ref 0.5–1.05)
CREAT UR-MCNC: 63.2 MG/DL (ref 20–320)
EGFRCR SERPLBLD CKD-EPI 2021: >90 ML/MIN/1.73M*2
EOSINOPHIL # BLD AUTO: 0.12 X10*3/UL (ref 0–0.7)
EOSINOPHIL NFR BLD AUTO: 1.6 %
ERYTHROCYTE [DISTWIDTH] IN BLOOD BY AUTOMATED COUNT: 13.2 % (ref 11.5–14.5)
EST. AVERAGE GLUCOSE BLD GHB EST-MCNC: 120 MG/DL
GLUCOSE SERPL-MCNC: 94 MG/DL (ref 74–99)
HBA1C MFR BLD: 5.8 %
HCT VFR BLD AUTO: 41.9 % (ref 36–46)
HDLC SERPL-MCNC: 37.3 MG/DL
HGB BLD-MCNC: 13.6 G/DL (ref 12–16)
IMM GRANULOCYTES # BLD AUTO: 0.03 X10*3/UL (ref 0–0.7)
IMM GRANULOCYTES NFR BLD AUTO: 0.4 % (ref 0–0.9)
LDLC SERPL CALC-MCNC: 81 MG/DL
LYMPHOCYTES # BLD AUTO: 2.68 X10*3/UL (ref 1.2–4.8)
LYMPHOCYTES NFR BLD AUTO: 35.8 %
MCH RBC QN AUTO: 30.4 PG (ref 26–34)
MCHC RBC AUTO-ENTMCNC: 32.5 G/DL (ref 32–36)
MCV RBC AUTO: 94 FL (ref 80–100)
MICROALBUMIN UR-MCNC: 25.4 MG/L
MICROALBUMIN/CREAT UR: 40.2 UG/MG CREAT
MONOCYTES # BLD AUTO: 0.59 X10*3/UL (ref 0.1–1)
MONOCYTES NFR BLD AUTO: 7.9 %
NEUTROPHILS # BLD AUTO: 4.02 X10*3/UL (ref 1.2–7.7)
NEUTROPHILS NFR BLD AUTO: 53.6 %
NON HDL CHOLESTEROL: 122 MG/DL (ref 0–149)
NRBC BLD-RTO: 0 /100 WBCS (ref 0–0)
PLATELET # BLD AUTO: 284 X10*3/UL (ref 150–450)
POTASSIUM SERPL-SCNC: 4 MMOL/L (ref 3.5–5.3)
PROT SERPL-MCNC: 6.6 G/DL (ref 6.4–8.2)
RBC # BLD AUTO: 4.48 X10*6/UL (ref 4–5.2)
SODIUM SERPL-SCNC: 139 MMOL/L (ref 136–145)
TRIGL SERPL-MCNC: 203 MG/DL (ref 0–149)
TSH SERPL-ACNC: 2.52 MIU/L (ref 0.44–3.98)
VIT B12 SERPL-MCNC: 218 PG/ML (ref 211–911)
VLDL: 41 MG/DL (ref 0–40)
WBC # BLD AUTO: 7.5 X10*3/UL (ref 4.4–11.3)

## 2024-08-07 PROCEDURE — 82607 VITAMIN B-12: CPT

## 2024-08-07 PROCEDURE — 80053 COMPREHEN METABOLIC PANEL: CPT

## 2024-08-07 PROCEDURE — 84443 ASSAY THYROID STIM HORMONE: CPT

## 2024-08-07 PROCEDURE — 82570 ASSAY OF URINE CREATININE: CPT

## 2024-08-07 PROCEDURE — 83036 HEMOGLOBIN GLYCOSYLATED A1C: CPT

## 2024-08-07 PROCEDURE — 36415 COLL VENOUS BLD VENIPUNCTURE: CPT

## 2024-08-07 PROCEDURE — 85025 COMPLETE CBC W/AUTO DIFF WBC: CPT

## 2024-08-07 PROCEDURE — 82306 VITAMIN D 25 HYDROXY: CPT

## 2024-08-07 PROCEDURE — 82043 UR ALBUMIN QUANTITATIVE: CPT

## 2024-08-07 PROCEDURE — 80061 LIPID PANEL: CPT

## 2024-08-12 ENCOUNTER — APPOINTMENT (OUTPATIENT)
Dept: PRIMARY CARE | Facility: CLINIC | Age: 39
End: 2024-08-12
Payer: COMMERCIAL

## 2024-08-12 VITALS
OXYGEN SATURATION: 97 % | BODY MASS INDEX: 46.72 KG/M2 | HEIGHT: 60 IN | WEIGHT: 238 LBS | SYSTOLIC BLOOD PRESSURE: 99 MMHG | TEMPERATURE: 97.6 F | DIASTOLIC BLOOD PRESSURE: 62 MMHG | RESPIRATION RATE: 16 BRPM | HEART RATE: 109 BPM

## 2024-08-12 DIAGNOSIS — F32.A DEPRESSIVE DISORDER: ICD-10-CM

## 2024-08-12 DIAGNOSIS — E78.2 MIXED HYPERLIPIDEMIA: ICD-10-CM

## 2024-08-12 DIAGNOSIS — E03.9 HYPOTHYROIDISM IN ADULT: ICD-10-CM

## 2024-08-12 DIAGNOSIS — E11.42 TYPE 2 DIABETES MELLITUS WITH DIABETIC POLYNEUROPATHY, WITHOUT LONG-TERM CURRENT USE OF INSULIN (MULTI): Primary | ICD-10-CM

## 2024-08-12 DIAGNOSIS — E66.01 MORBID OBESITY WITH BMI OF 45.0-49.9, ADULT (MULTI): ICD-10-CM

## 2024-08-12 PROCEDURE — 99214 OFFICE O/P EST MOD 30 MIN: CPT | Performed by: INTERNAL MEDICINE

## 2024-08-12 PROCEDURE — 3008F BODY MASS INDEX DOCD: CPT | Performed by: INTERNAL MEDICINE

## 2024-08-12 PROCEDURE — 3078F DIAST BP <80 MM HG: CPT | Performed by: INTERNAL MEDICINE

## 2024-08-12 PROCEDURE — 3044F HG A1C LEVEL LT 7.0%: CPT | Performed by: INTERNAL MEDICINE

## 2024-08-12 PROCEDURE — 4004F PT TOBACCO SCREEN RCVD TLK: CPT | Performed by: INTERNAL MEDICINE

## 2024-08-12 PROCEDURE — 3048F LDL-C <100 MG/DL: CPT | Performed by: INTERNAL MEDICINE

## 2024-08-12 PROCEDURE — 3074F SYST BP LT 130 MM HG: CPT | Performed by: INTERNAL MEDICINE

## 2024-08-12 PROCEDURE — 3061F NEG MICROALBUMINURIA REV: CPT | Performed by: INTERNAL MEDICINE

## 2024-08-12 SDOH — ECONOMIC STABILITY: FOOD INSECURITY: WITHIN THE PAST 12 MONTHS, THE FOOD YOU BOUGHT JUST DIDN'T LAST AND YOU DIDN'T HAVE MONEY TO GET MORE.: NEVER TRUE

## 2024-08-12 SDOH — ECONOMIC STABILITY: FOOD INSECURITY: WITHIN THE PAST 12 MONTHS, YOU WORRIED THAT YOUR FOOD WOULD RUN OUT BEFORE YOU GOT MONEY TO BUY MORE.: NEVER TRUE

## 2024-08-12 ASSESSMENT — LIFESTYLE VARIABLES
HOW OFTEN DO YOU HAVE A DRINK CONTAINING ALCOHOL: MONTHLY OR LESS
HOW MANY STANDARD DRINKS CONTAINING ALCOHOL DO YOU HAVE ON A TYPICAL DAY: 1 OR 2
SKIP TO QUESTIONS 9-10: 1
HOW OFTEN DO YOU HAVE SIX OR MORE DRINKS ON ONE OCCASION: NEVER
AUDIT-C TOTAL SCORE: 1

## 2024-08-12 ASSESSMENT — PATIENT HEALTH QUESTIONNAIRE - PHQ9
2. FEELING DOWN, DEPRESSED OR HOPELESS: NOT AT ALL
SUM OF ALL RESPONSES TO PHQ9 QUESTIONS 1 & 2: 0
1. LITTLE INTEREST OR PLEASURE IN DOING THINGS: NOT AT ALL

## 2024-08-12 NOTE — ASSESSMENT & PLAN NOTE
She appears stable now, she is living with her brother, no current issues, continue meds as ordered

## 2024-08-12 NOTE — PROGRESS NOTES
Chief Complaint/HPI:    Follow up:    Depression/Anxiety: Patient is not seen by Psychology nor seeking Counseling. Patient takes mirtazapine, hydroxyzine as needed, duloxetine and bupropion.     Insomnia: Patient is currently on Mirtazapine 15 mg. Patient smokes about 1 pack every 14 days, she trying to quit.      KCGO6OS: Patient is currently on Metformin 500 mg BID. Jardiance and Ozempic. Patient has tried to contact  clinical pharmacy , her lowest weight has been 233#, she is making a concerted effort to lose weight. Patient notes that the Mounjaro seems to be more effective than the Ozempic, she has not been able to get the Mounjaro recently.       CTS: had bilateral carpal tunnel release completed.      Hypothyroidism: Patient is currently on 75 mcg Levothyroxine.      HLD: Patient is currently on 20 mg Atorvastatin taken nightly and 160 mg Fenofibrate.      ROS otherwise negative aside from what was mentioned above in HPI.      Patient Active Problem List   Diagnosis    Acute neck pain    Acute UTI    Anxiety    Asthma (HHS-HCC)    Bilateral edema of lower extremity    Chronic sciatica of left side    Cigarette smoker    Cracking skin    Depressive disorder    Eczema    Hypothyroidism in adult    Incisional hernia, without obstruction or gangrene    Loss of libido    Low back pain    Mixed hyperlipidemia    Type 2 diabetes mellitus with diabetic polyneuropathy, without long-term current use of insulin (Multi)    Morbid obesity with BMI of 45.0-49.9, adult (Multi)    Obstructive sleep apnea    Palpitations    Primary osteoarthritis of left hip    Right knee pain    S/P abdominal hysterectomy and left salpingo-oophorectomy    Solitary kidney, congenital    Trapezius muscle spasm    Umbilical hernia without obstruction and without gangrene    Vitamin B12 deficiency    Vitamin D insufficiency    Wound, open, leg    Leg swelling    Amenorrhea    Carpal tunnel syndrome, bilateral upper limbs    Knee pain    Symptom  of leg swelling    Bilateral carpal tunnel syndrome    Abdominal pain    HCAP (healthcare-associated pneumonia)    Hypokalemia    Sepsis (Multi)    Tachycardia    Torsion of left ovary and ovarian pedicle    Hypoxia    Obesity    DRISS (obstructive sleep apnea)    Carpal tunnel syndrome on left    Cardiac arrhythmia    Peripheral vascular disease (CMS-HCC)    History of obesity         Past Medical History:   Diagnosis Date    Developmental ovarian cyst     Multiple developmental ovarian cysts    Personal history of other diseases of the musculoskeletal system and connective tissue     History of arthritis    Personal history of other diseases of the musculoskeletal system and connective tissue     History of chronic back pain    Personal history of other diseases of the respiratory system     History of bronchitis    Personal history of other endocrine, nutritional and metabolic disease     History of morbid obesity    Personal history of pneumonia (recurrent)     History of pneumonia     Past Surgical History:   Procedure Laterality Date    CARPAL TUNNEL RELEASE Right 04/25/2023    CARPAL TUNNEL RELEASE Left 03/2024    OTHER SURGICAL HISTORY  09/10/2019    Laparoscopy    OTHER SURGICAL HISTORY  09/10/2019    Oophorectomy    OTHER SURGICAL HISTORY  12/11/2019    Umbilical hernia repair     Social History     Social History Narrative    Not on file         ALLERGIES  Patient has no known allergies.      MEDICATIONS  Current Outpatient Medications on File Prior to Visit   Medication Sig Dispense Refill    atorvastatin (Lipitor) 20 mg tablet TAKE ONE TABLET BY MOUTH EVERY DAY 90 tablet 0    blood sugar diagnostic (OneTouch Verio test strips) strip Inject 1 each under the skin 2 times a day. 100 strip 3    buPROPion SR (Wellbutrin SR) 150 mg 12 hr tablet TAKE ONE TABLET BY MOUTH EVERY 12 HOURS. do not crush, chew, or split 180 tablet 0    cholecalciferol (Vitamin D-3) 25 MCG (1000 UT) tablet TAKE TWO TABLETS BY MOUTH  DAILY 180 tablet 0    cyanocobalamin, vitamin B-12, (Vitamin B-12) 1,000 mcg tablet extended release Take 1 tablet (1,000 mcg) by mouth once daily. As directed 90 tablet 3    DULoxetine (Cymbalta) 60 mg DR capsule TAKE ONE CAPSULE BY MOUTH EVERY DAY 90 capsule 0    empagliflozin (Jardiance) 25 mg Take 1 tablet (25 mg) by mouth once daily. Take 1 tablet daily, start in 2 weeks 90 tablet 3    fenofibrate (Triglide) 160 mg tablet Take 1 tablet (160 mg) by mouth once daily. 90 tablet 1    gabapentin (Neurontin) 300 mg capsule TAKE ONE CAPSULE BY MOUTH EVERY DAY AT BEDTIME 90 capsule 1    hydrOXYzine HCL (Atarax) 25 mg tablet Take 1 tablet (25 mg) by mouth 3 times a day as needed for anxiety. 90 tablet 1    lancets 33 gauge misc 1 each once daily. Use as directed 100 each 3    levothyroxine (Synthroid, Levoxyl) 75 mcg tablet Take 1 tablet (75 mcg) by mouth once daily. As directed 90 tablet 1    melatonin 10 mg capsule Take 2 capsules (20 mg) by mouth once daily at bedtime. 180 capsule 1    metFORMIN  mg 24 hr tablet TAKE TWO TABLETS BY MOUTH DAILY 180 tablet 1    mirtazapine (Remeron) 15 mg tablet TAKE 1 TABLET BY MOUTH NIGHTLY AT BEDTIME. TAKE AN ADDITIONAL TABLET IF 1 TABLET IS NOT EFFECTIVE. 90 tablet 0    semaglutide (Ozempic) 2 mg/dose (8 mg/3 mL) pen injector Inject 2 mg under the skin every 7 days. 9 mL 3    tirzepatide (Mounjaro) 10 mg/0.5 mL pen injector Inject 10 mg under the skin 1 (one) time per week. Stop Ozempic when starting Mounjaro, take the first dose 7 days after last Ozempic dose (Patient not taking: Reported on 7/24/2024) 2 mL 3     No current facility-administered medications on file prior to visit.         PHYSICAL EXAM  BP 99/62 (BP Location: Right arm, Patient Position: Sitting, BP Cuff Size: Large adult)   Pulse 109   Temp 36.4 °C (97.6 °F)   Resp 16   Ht 1.524 m (5')   Wt 108 kg (238 lb)   SpO2 97%   BMI 46.48 kg/m²   Body mass index is 46.48 kg/m².    Gen: Alert, NAD,she is  morbidly obese, she has significant abdominal obesity  HEENT:  EOMI, conjunctiva and sclera normal in appearance, no thyromegaly or masses, no carotid bruits  Respiratory:  Lungs CTAB  Cardiovascular:  Heart RRR. No M/R/G, no peripheral edema is noted, a few varicosities are noted on the lower extremities  Neuro:  Gross motor and sensory intact  Skin:  No suspicious lesions present on exposed skin          ASSESSMENT/PLAN  Problem List Items Addressed This Visit       Depressive disorder    Current Assessment & Plan     She appears stable now, she is living with her brother, no current issues, continue meds as ordered         Relevant Orders    CBC and Auto Differential    Comprehensive Metabolic Panel    Hypothyroidism in adult    Current Assessment & Plan     TSH is stable, no changes         Relevant Orders    CBC and Auto Differential    Comprehensive Metabolic Panel    TSH with reflex to Free T4 if abnormal    Mixed hyperlipidemia    Relevant Orders    Lipid Panel    Morbid obesity with BMI of 45.0-49.9, adult (Multi)    Current Assessment & Plan     Referral made to bariatrics for an assessment of the patient's current issues, she does exercise          Relevant Orders    Referral to Bariatric Surgery    CBC and Auto Differential    Comprehensive Metabolic Panel    Type 2 diabetes mellitus with diabetic polyneuropathy, without long-term current use of insulin (Multi) - Primary    Current Assessment & Plan     Continue current med therapy, she has  some difficulty getting Mounjaro, HgbA1C has improved.          Relevant Orders    Referral to Bariatric Surgery    Albumin-Creatinine Ratio, Urine Random    CBC and Auto Differential    Comprehensive Metabolic Panel    Hemoglobin A1C     Recheck labs in 4-6 months, bariatric evaluation ordered  Stop smoking the 2 cigarettes per day that she has been smoking   Tal Piña MD

## 2024-08-14 DIAGNOSIS — G47.33 OSA (OBSTRUCTIVE SLEEP APNEA): ICD-10-CM

## 2024-08-14 DIAGNOSIS — G47.01 INSOMNIA DUE TO MEDICAL CONDITION: ICD-10-CM

## 2024-08-14 DIAGNOSIS — F32.A DEPRESSIVE DISORDER: ICD-10-CM

## 2024-08-15 RX ORDER — DULOXETIN HYDROCHLORIDE 60 MG/1
60 CAPSULE, DELAYED RELEASE ORAL DAILY
Qty: 90 CAPSULE | Refills: 1 | Status: SHIPPED | OUTPATIENT
Start: 2024-08-15

## 2024-08-15 RX ORDER — MIRTAZAPINE 15 MG/1
TABLET, FILM COATED ORAL
Qty: 90 TABLET | Refills: 1 | Status: SHIPPED | OUTPATIENT
Start: 2024-08-15

## 2024-08-25 DIAGNOSIS — E78.2 MIXED HYPERLIPIDEMIA: ICD-10-CM

## 2024-08-26 DIAGNOSIS — F32.A DEPRESSIVE DISORDER: ICD-10-CM

## 2024-08-26 DIAGNOSIS — G47.01 INSOMNIA DUE TO MEDICAL CONDITION: ICD-10-CM

## 2024-08-26 DIAGNOSIS — G47.33 OSA (OBSTRUCTIVE SLEEP APNEA): ICD-10-CM

## 2024-08-26 RX ORDER — FENOFIBRATE 160 MG/1
160 TABLET ORAL DAILY
Qty: 90 TABLET | Refills: 0 | Status: SHIPPED | OUTPATIENT
Start: 2024-08-26

## 2024-08-26 RX ORDER — MIRTAZAPINE 15 MG/1
TABLET, FILM COATED ORAL
Qty: 90 TABLET | Refills: 1 | Status: SHIPPED | OUTPATIENT
Start: 2024-08-26

## 2024-09-02 ENCOUNTER — PATIENT MESSAGE (OUTPATIENT)
Dept: PRIMARY CARE | Facility: CLINIC | Age: 39
End: 2024-09-02
Payer: COMMERCIAL

## 2024-09-02 DIAGNOSIS — E11.42 TYPE 2 DIABETES MELLITUS WITH DIABETIC POLYNEUROPATHY, WITHOUT LONG-TERM CURRENT USE OF INSULIN (MULTI): ICD-10-CM

## 2024-09-02 DIAGNOSIS — E66.01 CLASS 3 SEVERE OBESITY DUE TO EXCESS CALORIES WITH BODY MASS INDEX (BMI) OF 45.0 TO 49.9 IN ADULT, UNSPECIFIED WHETHER SERIOUS COMORBIDITY PRESENT (MULTI): ICD-10-CM

## 2024-09-03 RX ORDER — TIRZEPATIDE 12.5 MG/.5ML
12.5 INJECTION, SOLUTION SUBCUTANEOUS WEEKLY
Qty: 6 ML | Refills: 2 | Status: SHIPPED | OUTPATIENT
Start: 2024-09-03

## 2024-09-03 RX ORDER — TIRZEPATIDE 10 MG/.5ML
INJECTION, SOLUTION SUBCUTANEOUS
Qty: 2 ML | Refills: 0 | Status: SHIPPED | OUTPATIENT
Start: 2024-09-03 | End: 2024-09-03 | Stop reason: DRUGHIGH

## 2024-09-12 ENCOUNTER — OFFICE VISIT (OUTPATIENT)
Dept: URGENT CARE | Age: 39
End: 2024-09-12
Payer: COMMERCIAL

## 2024-09-12 ENCOUNTER — TELEPHONE (OUTPATIENT)
Dept: PRIMARY CARE | Facility: CLINIC | Age: 39
End: 2024-09-12
Payer: COMMERCIAL

## 2024-09-12 VITALS
SYSTOLIC BLOOD PRESSURE: 115 MMHG | OXYGEN SATURATION: 98 % | TEMPERATURE: 97.6 F | DIASTOLIC BLOOD PRESSURE: 74 MMHG | HEART RATE: 90 BPM

## 2024-09-12 DIAGNOSIS — M54.50 ACUTE RIGHT-SIDED LOW BACK PAIN WITHOUT SCIATICA: Primary | ICD-10-CM

## 2024-09-12 DIAGNOSIS — M76.891 HIP FLEXOR TENDINITIS, RIGHT: ICD-10-CM

## 2024-09-12 RX ORDER — PREDNISONE 20 MG/1
20 TABLET ORAL 2 TIMES DAILY
Qty: 10 TABLET | Refills: 0 | Status: SHIPPED | OUTPATIENT
Start: 2024-09-12 | End: 2024-09-17

## 2024-09-12 ASSESSMENT — ENCOUNTER SYMPTOMS
MYALGIAS: 1
BACK PAIN: 1
RESPIRATORY NEGATIVE: 1
CARDIOVASCULAR NEGATIVE: 1
CONSTITUTIONAL NEGATIVE: 1

## 2024-09-12 NOTE — TELEPHONE ENCOUNTER
Patient was in ER on 09/11/2024 with back pain.  She states that ER said it was a muscle strain and prescribed Flexeril and a Lidocaine Patch.  She is not getting any relief.  She feels like she can barely take a breath. Please advise.    Patient/Spouse

## 2024-09-12 NOTE — PROGRESS NOTES
Subjective   Patient ID: Norah Marquez is a 39 y.o. female. They present today with a chief complaint of Back Pain (Pt presents with severe back pain into hip. Went to [facility] ER and was prescribed flexeril says it did not help).    History of Present Illness  HPI a 39-year-old female arrives to the clinic with chief complaint of right side of lower back pain and thigh/groin pain.  Patient reports that she was seen and treated at the local freestanding emergency department and was diagnosed with right sided lumbar go with sciatic pain.  She was given a prescription for lidocaine patches and Flexeril.  She reports that her symptoms did not go away.  She believes that this is more than sciatic pain.  She is here for further evaluation and health maintenance.    Past Medical History  Allergies as of 09/12/2024    (No Known Allergies)       (Not in a hospital admission)       Past Medical History:   Diagnosis Date    Developmental ovarian cyst     Multiple developmental ovarian cysts    Personal history of other diseases of the musculoskeletal system and connective tissue     History of arthritis    Personal history of other diseases of the musculoskeletal system and connective tissue     History of chronic back pain    Personal history of other diseases of the respiratory system     History of bronchitis    Personal history of other endocrine, nutritional and metabolic disease     History of morbid obesity    Personal history of pneumonia (recurrent)     History of pneumonia       Past Surgical History:   Procedure Laterality Date    CARPAL TUNNEL RELEASE Right 04/25/2023    CARPAL TUNNEL RELEASE Left 03/2024    OTHER SURGICAL HISTORY  09/10/2019    Laparoscopy    OTHER SURGICAL HISTORY  09/10/2019    Oophorectomy    OTHER SURGICAL HISTORY  12/11/2019    Umbilical hernia repair        reports that she has been smoking cigarettes. She has never used smokeless tobacco. She reports current alcohol use of about  2.0 - 3.0 standard drinks of alcohol per week. She reports that she does not use drugs.    Review of Systems  Review of Systems   Constitutional: Negative.    Respiratory: Negative.     Cardiovascular: Negative.    Musculoskeletal:  Positive for back pain and myalgias.     Objective    Vitals:    09/12/24 1501   BP: 115/74   Pulse: 90   Temp: 36.4 °C (97.6 °F)   SpO2: 98%     No LMP recorded. Patient has had a hysterectomy.    Physical Exam  Constitutional:       Appearance: Normal appearance.   Musculoskeletal:         General: Normal range of motion.   Neurological:      Mental Status: She is alert.         Procedures    Point of Care Test & Imaging Results from this visit  Results for orders placed or performed in visit on 08/07/24   CBC and Auto Differential   Result Value Ref Range    WBC 7.5 4.4 - 11.3 x10*3/uL    nRBC 0.0 0.0 - 0.0 /100 WBCs    RBC 4.48 4.00 - 5.20 x10*6/uL    Hemoglobin 13.6 12.0 - 16.0 g/dL    Hematocrit 41.9 36.0 - 46.0 %    MCV 94 80 - 100 fL    MCH 30.4 26.0 - 34.0 pg    MCHC 32.5 32.0 - 36.0 g/dL    RDW 13.2 11.5 - 14.5 %    Platelets 284 150 - 450 x10*3/uL    Neutrophils % 53.6 40.0 - 80.0 %    Immature Granulocytes %, Automated 0.4 0.0 - 0.9 %    Lymphocytes % 35.8 13.0 - 44.0 %    Monocytes % 7.9 2.0 - 10.0 %    Eosinophils % 1.6 0.0 - 6.0 %    Basophils % 0.7 0.0 - 2.0 %    Neutrophils Absolute 4.02 1.20 - 7.70 x10*3/uL    Immature Granulocytes Absolute, Automated 0.03 0.00 - 0.70 x10*3/uL    Lymphocytes Absolute 2.68 1.20 - 4.80 x10*3/uL    Monocytes Absolute 0.59 0.10 - 1.00 x10*3/uL    Eosinophils Absolute 0.12 0.00 - 0.70 x10*3/uL    Basophils Absolute 0.05 0.00 - 0.10 x10*3/uL   Comprehensive Metabolic Panel   Result Value Ref Range    Glucose 94 74 - 99 mg/dL    Sodium 139 136 - 145 mmol/L    Potassium 4.0 3.5 - 5.3 mmol/L    Chloride 109 (H) 98 - 107 mmol/L    Bicarbonate 24 21 - 32 mmol/L    Anion Gap 10 10 - 20 mmol/L    Urea Nitrogen 13 6 - 23 mg/dL    Creatinine 0.76 0.50  - 1.05 mg/dL    eGFR >90 >60 mL/min/1.73m*2    Calcium 8.9 8.6 - 10.3 mg/dL    Albumin 4.2 3.4 - 5.0 g/dL    Alkaline Phosphatase 48 33 - 110 U/L    Total Protein 6.6 6.4 - 8.2 g/dL    AST 16 9 - 39 U/L    Bilirubin, Total 0.3 0.0 - 1.2 mg/dL    ALT 17 7 - 45 U/L   Hemoglobin A1C   Result Value Ref Range    Hemoglobin A1C 5.8 (H) see below %    Estimated Average Glucose 120 Not Established mg/dL   Lipid Panel   Result Value Ref Range    Cholesterol 159 0 - 199 mg/dL    HDL-Cholesterol 37.3 mg/dL    Cholesterol/HDL Ratio 4.3     LDL Calculated 81 <=99 mg/dL    VLDL 41 (H) 0 - 40 mg/dL    Triglycerides 203 (H) 0 - 149 mg/dL    Non HDL Cholesterol 122 0 - 149 mg/dL   TSH with reflex to Free T4 if abnormal   Result Value Ref Range    Thyroid Stimulating Hormone 2.52 0.44 - 3.98 mIU/L   Albumin , Urine Random   Result Value Ref Range    Albumin, Urine Random 25.4 Not established mg/L    Creatinine, Urine Random 63.2 20.0 - 320.0 mg/dL    Albumin/Creatinine Ratio 40.2 (H) <30.0 ug/mg Creat   Vitamin D 25-Hydroxy,Total (for eval of Vitamin D levels)   Result Value Ref Range    Vitamin D, 25-Hydroxy, Total 31 30 - 100 ng/mL   Vitamin B12   Result Value Ref Range    Vitamin B12 218 211 - 911 pg/mL     No results found.    Diagnostic study results (if any) were reviewed by CECELIA Mukherjee.    Assessment/Plan   Allergies, medications, history, and pertinent labs/EKGs/Imaging reviewed by CECELIA Mukherjee.     Medical Decision Making  Upon initial assessment, the patient was sitting calmly in bedside chair in no acute distress.  She was no evidence of cauda equina she denies any urinary or bowel like incontinence.  Her symptoms does radiate from her right flank down to her right groin area where her hip flexors located.  Given her symptoms, this is likely a lumbar go with hip flexor tendinitis.  Nonpharmacological physical therapy exercises were discussed in the office.  She can follow-up with her primary  care provider for physical therapy.  Exercise bands were discussed as well.  Prednisone 20 mg oral tablet twice a day for 5 days has been sent.  Continue with the Flexeril that was prescribed by your ER physician.  For any worsening signs or symptoms, follow-up with your primary care provider.    As a result of the work-up, the patient was discharged home.  she was informed of her diagnosis and instructed to come back with any concerns or worsening of condition.  she and was agreeable to the plan as discussed above.  she was given the opportunity to ask questions.  All of the patient's questions were answered.    This document was generated using the assistance of voice recognition software. If there are any errors of spelling, grammar, syntax, or meaning; please feel free to contact me directly for clarification.     Orders and Diagnoses  Diagnoses and all orders for this visit:  Acute right-sided low back pain without sciatica  -     predniSONE (Deltasone) 20 mg tablet; Take 1 tablet (20 mg) by mouth 2 times a day for 5 days.  Hip flexor tendinitis, right  -     predniSONE (Deltasone) 20 mg tablet; Take 1 tablet (20 mg) by mouth 2 times a day for 5 days.      Medical Admin Record      Follow Up Instructions  No follow-ups on file.    Patient disposition: Home    Electronically signed by CECELIA Mukherjee  3:24 PM

## 2024-09-14 DIAGNOSIS — Z79.899 MEDICATION MANAGEMENT: ICD-10-CM

## 2024-09-14 DIAGNOSIS — E11.42 TYPE 2 DIABETES MELLITUS WITH DIABETIC POLYNEUROPATHY, WITHOUT LONG-TERM CURRENT USE OF INSULIN: ICD-10-CM

## 2024-09-14 DIAGNOSIS — E66.01 OBESITY, MORBID (MORE THAN 100 LBS OVER IDEAL WEIGHT OR BMI > 40) (MULTI): ICD-10-CM

## 2024-09-16 RX ORDER — CYCLOBENZAPRINE HCL 10 MG
10 TABLET ORAL 2 TIMES DAILY PRN
COMMUNITY
Start: 2024-09-11 | End: 2024-09-16

## 2024-09-16 RX ORDER — SEMAGLUTIDE 2.68 MG/ML
2 INJECTION, SOLUTION SUBCUTANEOUS
Qty: 9 ML | Refills: 0 | Status: SHIPPED | OUTPATIENT
Start: 2024-09-16

## 2024-09-16 NOTE — PROGRESS NOTES
Pharmacist Clinic: Diabetes Management  Norah Marquez is a 39 y.o. female was referred to Clinical Pharmacy Team for diabetes management.   Referring Provider: Tal Piña, *  - Last visit with referring provider: 8/12/24    Subjective     HPI    Current Diabetes Pharmacotherapy:    - Jardiance 25 mg daily  - Metformin  mg 2 tabs daily   - Mounjaro 12.5 mg weekly - yesterday was 2nd dose (Mondays)    Social History:  Current diet:   - Still thinking about food but doesn't act upon it  - Fasting at times  - D: salad and air fried chicken    Current exercise:   - Exercising     Weight loss:  - July 31 = 25 lbs since  - Feels like she is not losing weight like how other people are  - Aug 1: 256 lbs  - Current weight: 232.6 lbs    Current monitoring regimen:   Patient is using: glucometer  Type of CGM: N/A     Patient is testing blood sugars twice a week    Reported blood sugars:     2-Hours Post Prandial: 74-80, hasn't been higher than 91     Any episodes of hypoglycemia? no    Adverse Effects: None    Objective     There were no vitals taken for this visit.    No Known Allergies    Historical Diabetes Pharmacotherapy:  - Ozempic     SECONDARY PREVENTION  - Statin? Yes   - ACE-I/ARB? No  - Aspirin? No    Pertinent PMH Review:  - PMH of Pancreatitis: No  - PMH of Retinopathy: No  - PMH of Urinary Tract Infections: No  - PMH of Yeast Infections: No  - PMH of MTC: No    Lab Review  Lab Results   Component Value Date    BILITOT 0.3 08/07/2024    CALCIUM 8.9 08/07/2024    CO2 24 08/07/2024     (H) 08/07/2024    CREATININE 0.76 08/07/2024    GLUCOSE 94 08/07/2024    ALKPHOS 48 08/07/2024    K 4.0 08/07/2024    PROT 6.6 08/07/2024     08/07/2024    AST 16 08/07/2024    ALT 17 08/07/2024    BUN 13 08/07/2024    ANIONGAP 10 08/07/2024    ALBUMIN 4.2 08/07/2024    GFRF 88 09/20/2023     Lab Results   Component Value Date    TRIG 203 (H) 08/07/2024    CHOL 159 08/07/2024    HDL 37.3 08/07/2024      Lab Results   Component Value Date    HGBA1C 5.8 (H) 08/07/2024    HGBA1C 6.2 (H) 03/14/2024    HGBA1C 6.2 (A) 09/20/2023     The ASCVD Risk score (Brittani DANIEL, et al., 2019) failed to calculate for the following reasons:    The 2019 ASCVD risk score is only valid for ages 40 to 79    Drug Interactions:  None    Affordability/Accessibility:  - Hard time finding Mounjaro at pharmacy  - Price of Mounjaro will go back up in January   - Ineligible for PAP    Preferred Pharmacy:  [facility]    Assessment/Plan   Problem List Items Addressed This Visit    None  Visit Diagnoses       Medication management        Relevant Orders    Follow Up In Clinical Pharmacy            ASSESSMENT:  Patients diabetes is controlled with most recent A1c of 5.8%.     Patient referred for weight management as well DM management. She was having a hard time getting Mounjaro in the pharmacy. Has not has any issues lately. She is actively losing about 1.3 lbs a week. We will continue current dose of Mounjaro and evaluate next appointment if we need to increase.    PLAN:  CONTINUE Mounjaro 12.5 mg  CONTINUE all other DM medications  Education provided: weight loss goals  Follow up with clinical pharmacist: 10/21/24 @ 9:30  Follow up with PCP: 2/14/25    Thank you,  Melyssa King, PharmD  Clinical Pharmacy Specialist  [phone]  [email or web address]     Continue all meds under the continuation of care with the referring provider and clinical pharmacy team.

## 2024-09-17 ENCOUNTER — APPOINTMENT (OUTPATIENT)
Dept: PHARMACY | Facility: HOSPITAL | Age: 39
End: 2024-09-17
Payer: COMMERCIAL

## 2024-09-17 DIAGNOSIS — Z79.899 MEDICATION MANAGEMENT: ICD-10-CM

## 2024-09-18 ENCOUNTER — PATIENT MESSAGE (OUTPATIENT)
Dept: PRIMARY CARE | Facility: CLINIC | Age: 39
End: 2024-09-18
Payer: COMMERCIAL

## 2024-09-18 DIAGNOSIS — M76.891 HIP FLEXOR TENDINITIS, RIGHT: ICD-10-CM

## 2024-09-18 DIAGNOSIS — M54.50 ACUTE RIGHT-SIDED LOW BACK PAIN WITHOUT SCIATICA: ICD-10-CM

## 2024-09-18 RX ORDER — PREDNISONE 20 MG/1
20 TABLET ORAL 2 TIMES DAILY
Qty: 10 TABLET | Refills: 0 | Status: SHIPPED | OUTPATIENT
Start: 2024-09-18 | End: 2024-09-23

## 2024-09-22 DIAGNOSIS — E03.9 HYPOTHYROIDISM IN ADULT: ICD-10-CM

## 2024-09-22 DIAGNOSIS — E53.8 VITAMIN B12 DEFICIENCY: ICD-10-CM

## 2024-09-23 RX ORDER — LEVOTHYROXINE SODIUM 75 UG/1
75 TABLET ORAL DAILY
Qty: 90 TABLET | Refills: 1 | Status: SHIPPED | OUTPATIENT
Start: 2024-09-23

## 2024-09-23 RX ORDER — CHOLECALCIFEROL (VITAMIN D3) 25 MCG
1 TABLET,CHEWABLE ORAL DAILY
Qty: 90 TABLET | Refills: 1 | Status: SHIPPED | OUTPATIENT
Start: 2024-09-23

## 2024-10-18 NOTE — PROGRESS NOTES
Pharmacist Clinic: Diabetes Management  Norah Marquez is a 39 y.o. female was referred to Clinical Pharmacy Team for diabetes management.   Referring Provider: Tal Piña, *  - Last visit with referring provider: 8/12/24    Subjective     HPI    Current Diabetes Pharmacotherapy:    - Jardiance 25 mg daily  - Metformin  mg 2 tabs daily   - Mounjaro 12.5 mg weekly - Mondays    Social History:    Current diet:   - Still thinking about food but doesn't act upon it  - Fasting at times  - D: salad and air fried chicken  - Little bit cohen faster   - Does not crave foods  - Salad every night and chicken  - Cut pop   - Weakness is skittles    Current exercise:   - Exercising   - Completed a 5k over the weekend    Weight loss:  - July 31 = 25 lbs since  - Aug 1: 256 lbs  - Weight last appt: 232.6 lbs  - Current weight: 226.8 lbs   - Next goal weight: 215 lbs    Current monitoring regimen:   Patient is using: glucometer  Type of CGM: N/A     Patient is testing blood sugars twice a week    Reported blood sugars:   - Wasn't sure if she did medication twice   - Felt really tired, unable to eat    2-Hours Post Prandial: 84, 85     Any episodes of hypoglycemia? no    Adverse Effects: None    Objective     There were no vitals taken for this visit.    No Known Allergies    Historical Diabetes Pharmacotherapy:  - Ozempic     SECONDARY PREVENTION  - Statin? Yes   - ACE-I/ARB? No  - Aspirin? No    Pertinent PMH Review:  - PMH of Pancreatitis: No  - PMH of Retinopathy: No  - PMH of Urinary Tract Infections: No  - PMH of Yeast Infections: No  - PMH of MTC: No    Lab Review  Lab Results   Component Value Date    BILITOT 0.3 08/07/2024    CALCIUM 8.9 08/07/2024    CO2 24 08/07/2024     (H) 08/07/2024    CREATININE 0.76 08/07/2024    GLUCOSE 94 08/07/2024    ALKPHOS 48 08/07/2024    K 4.0 08/07/2024    PROT 6.6 08/07/2024     08/07/2024    AST 16 08/07/2024    ALT 17 08/07/2024    BUN 13 08/07/2024     ANIONGAP 10 08/07/2024    ALBUMIN 4.2 08/07/2024    GFRF 88 09/20/2023     Lab Results   Component Value Date    TRIG 203 (H) 08/07/2024    CHOL 159 08/07/2024    HDL 37.3 08/07/2024     Lab Results   Component Value Date    HGBA1C 5.8 (H) 08/07/2024    HGBA1C 6.2 (H) 03/14/2024    HGBA1C 6.2 (A) 09/20/2023     The ASCVD Risk score (Brittani DANIEL, et al., 2019) failed to calculate for the following reasons:    The 2019 ASCVD risk score is only valid for ages 40 to 79    Drug Interactions:  None     Affordability/Accessibility:  - Hard time finding Mounjaro at pharmacy  - Price of Mounjaro will go back up in January   - Ineligible for PAP    Preferred Pharmacy:  [facility]    Assessment/Plan   Problem List Items Addressed This Visit    None  Visit Diagnoses       Medication management        Relevant Orders    Referral to Clinical Pharmacy            ASSESSMENT:  Patients diabetes is controlled with most recent A1c of 5.8%.     Patient referred for weight management as well DM management. Has lost 6 lbs since our last visit. States she only really eats chicken, salad, and skittles. She states she was feeling a little disappointed with her weight loss but I explained 1-2 lbs a week is the goal for a healthy and good weight loss. For today, we will continue current regimen. Next month we can consider increasing to 15 mg if needed. She is worried that she will have to stop Mounjaro in January due to deductible. We will discuss then.    PLAN:  CONTINUE all DM medications  Follow up with clinical pharmacist: 11/18/24 @ 9:20 am   Follow up with PCP: 2/14/25    Thank you,  Melyssa King, PharmD  Clinical Pharmacy Specialist  [phone]  [email or web address]     Continue all meds under the continuation of care with the referring provider and clinical pharmacy team.

## 2024-10-21 ENCOUNTER — APPOINTMENT (OUTPATIENT)
Dept: PHARMACY | Facility: HOSPITAL | Age: 39
End: 2024-10-21
Payer: COMMERCIAL

## 2024-10-21 DIAGNOSIS — Z79.899 MEDICATION MANAGEMENT: ICD-10-CM

## 2024-10-30 ENCOUNTER — OFFICE VISIT (OUTPATIENT)
Dept: ORTHOPEDIC SURGERY | Facility: CLINIC | Age: 39
End: 2024-10-30
Payer: COMMERCIAL

## 2024-10-30 VITALS — BODY MASS INDEX: 44.17 KG/M2 | HEIGHT: 60 IN | WEIGHT: 225 LBS

## 2024-10-30 DIAGNOSIS — R22.31 MASS OF RIGHT WRIST: Primary | ICD-10-CM

## 2024-10-30 DIAGNOSIS — G56.02 CARPAL TUNNEL SYNDROME ON LEFT: ICD-10-CM

## 2024-10-30 PROCEDURE — 3008F BODY MASS INDEX DOCD: CPT | Performed by: ORTHOPAEDIC SURGERY

## 2024-10-30 PROCEDURE — 99214 OFFICE O/P EST MOD 30 MIN: CPT | Performed by: ORTHOPAEDIC SURGERY

## 2024-10-30 PROCEDURE — 3044F HG A1C LEVEL LT 7.0%: CPT | Performed by: ORTHOPAEDIC SURGERY

## 2024-10-30 PROCEDURE — 3061F NEG MICROALBUMINURIA REV: CPT | Performed by: ORTHOPAEDIC SURGERY

## 2024-10-30 PROCEDURE — 4004F PT TOBACCO SCREEN RCVD TLK: CPT | Performed by: ORTHOPAEDIC SURGERY

## 2024-10-30 PROCEDURE — 3048F LDL-C <100 MG/DL: CPT | Performed by: ORTHOPAEDIC SURGERY

## 2024-10-30 ASSESSMENT — PAIN - FUNCTIONAL ASSESSMENT: PAIN_FUNCTIONAL_ASSESSMENT: 0-10

## 2024-10-30 ASSESSMENT — PAIN SCALES - GENERAL: PAINLEVEL_OUTOF10: 6

## 2024-11-01 DIAGNOSIS — G56.01 CARPAL TUNNEL SYNDROME OF RIGHT WRIST: ICD-10-CM

## 2024-11-12 ENCOUNTER — EVALUATION (OUTPATIENT)
Dept: OCCUPATIONAL THERAPY | Facility: HOSPITAL | Age: 39
End: 2024-11-12
Payer: COMMERCIAL

## 2024-11-12 DIAGNOSIS — R29.898 WEAKNESS OF RIGHT HAND: ICD-10-CM

## 2024-11-12 DIAGNOSIS — G56.01 CARPAL TUNNEL SYNDROME OF RIGHT WRIST: Primary | ICD-10-CM

## 2024-11-12 DIAGNOSIS — M25.531 WRIST PAIN, ACUTE, RIGHT: ICD-10-CM

## 2024-11-12 PROCEDURE — 97110 THERAPEUTIC EXERCISES: CPT | Mod: GO

## 2024-11-12 PROCEDURE — 97165 OT EVAL LOW COMPLEX 30 MIN: CPT | Mod: GO

## 2024-11-12 ASSESSMENT — PAIN - FUNCTIONAL ASSESSMENT: PAIN_FUNCTIONAL_ASSESSMENT: 0-10

## 2024-11-12 ASSESSMENT — PAIN SCALES - GENERAL: PAINLEVEL_OUTOF10: 4

## 2024-11-12 ASSESSMENT — ENCOUNTER SYMPTOMS
DEPRESSION: 0
OCCASIONAL FEELINGS OF UNSTEADINESS: 0
LOSS OF SENSATION IN FEET: 0

## 2024-11-12 NOTE — PROGRESS NOTES
Pharmacist Clinic: Diabetes Management  Norah Marquez is a 39 y.o. female was referred to Clinical Pharmacy Team for diabetes management.   Referring Provider: Tal Piña, *  - Last visit with referring provider: 8/12/24    Subjective     HPI    Current Diabetes Pharmacotherapy:    - Jardiance 25 mg daily  - Metformin  mg 2 tabs daily   - Mounjaro 12.5 mg weekly - Mondays    Social History:    Current diet:   - Still thinking about food but doesn't act upon it  - Fasting at times  - D: salad and air fried chicken  - Little bit cohen faster   - Does not crave foods  - Salad every night and chicken  - Cut pop   - Weakness is skittles  - Towards end of the week feels a little more hungry  - Craves things she doesn't normally eat    Current exercise:   - Exercising   - Completed a 5k over the weekend    Weight loss:  - Weight last appt: 226.8 lbs  - Current weight:  220 lbs   - Next goal weight: 215 lbs    Current monitoring regimen:   Patient is using: glucometer  Type of CGM: N/A     Patient is testing blood sugars twice a week    Reported blood sugars:     2-Hours Post Prandial: 85-89   - Has not went over 90     Any episodes of hypoglycemia? no    Adverse Effects: None    Objective     There were no vitals taken for this visit.    No Known Allergies    Historical Diabetes Pharmacotherapy:  - Ozempic     SECONDARY PREVENTION  - Statin? Yes   - ACE-I/ARB? No  - Aspirin? No    Pertinent PMH Review:  - PMH of Pancreatitis: No  - PMH of Retinopathy: No  - PMH of Urinary Tract Infections: No  - PMH of Yeast Infections: No  - PMH of MTC: No    Lab Review  Lab Results   Component Value Date    BILITOT 0.3 08/07/2024    CALCIUM 8.9 08/07/2024    CO2 24 08/07/2024     (H) 08/07/2024    CREATININE 0.76 08/07/2024    GLUCOSE 94 08/07/2024    ALKPHOS 48 08/07/2024    K 4.0 08/07/2024    PROT 6.6 08/07/2024     08/07/2024    AST 16 08/07/2024    ALT 17 08/07/2024    BUN 13 08/07/2024    ANIONGAP  10 08/07/2024    ALBUMIN 4.2 08/07/2024    GFRF 88 09/20/2023     Lab Results   Component Value Date    TRIG 203 (H) 08/07/2024    CHOL 159 08/07/2024    HDL 37.3 08/07/2024     Lab Results   Component Value Date    HGBA1C 5.8 (H) 08/07/2024    HGBA1C 6.2 (H) 03/14/2024    HGBA1C 6.2 (A) 09/20/2023     The ASCVD Risk score (Brittani DANIEL, et al., 2019) failed to calculate for the following reasons:    The 2019 ASCVD risk score is only valid for ages 40 to 79    Drug Interactions:  None     Affordability/Accessibility:  - Price of Mounjaro will go back up in January   - Ineligible for PAP    Preferred Pharmacy:  [facility]    Assessment/Plan   Problem List Items Addressed This Visit    None  Visit Diagnoses       Medication management        Relevant Medications    tirzepatide (Mounjaro) 12.5 mg/0.5 mL pen injector    Other Relevant Orders    Referral to Clinical Pharmacy          ASSESSMENT:  Patients diabetes is controlled with most recent A1c of 5.8%.     Patient doing well with weight loss. Lost 6.8 lbs since last visit. Blood sugars are not breaking 90. Therefore, we will continue current dose of Mounjaro and decrease Metformin to just 1 tablet daily. Next month we will send a 3 month supply of Mounjaro.    PLAN:  DECREASE Metformin   CONTINUE all other DM medications  Follow up with clinical pharmacist: 12/16/24 @ 9:20   Follow up with PCP: 2/14/25    Thank you,  Melyssa Kign, PharmD  Clinical Pharmacy Specialist  [phone]  [email or web address]     Continue all meds under the continuation of care with the referring provider and clinical pharmacy team.

## 2024-11-12 NOTE — PROGRESS NOTES
Occupational Therapy    Evaluation/Treatment    Patient Name: Norah Marquez  MRN: [id number]  : 1985  Today's Date: 24     Time Calculation  Start Time: 1545  Stop Time: 1630  Time Calculation (min): 45 min  Therapeutic Procedure Codes:  OT Evaluation Time Entry  OT Evaluation (Low) Time Entry: 30   OT Therapeutic Procedures Time Entry  Therapeutic Exercise Time Entry: 15                         Subjective   Current Problem:  1. Carpal tunnel syndrome of right wrist  Referral to Occupational Therapy      2. Wrist pain, acute, right        3. Weakness of right hand          General: Pt presents to therapy d/t pain and weakness at R wrist. Pt had a R ECTR performed by Dr. Aranda in 2023. Pt reports that her CT symptoms resolved after surgery; however, recently she has been experiencing extreme sensitivity, pain and weakness at R wrist. Pt went to ortho for further investigation. Per doctor's note, source of deficits may be d/t scar tissue vs ganglion cyst. Pt is to try therapy first, if no progress will discuss alternative treatment options with Dr. Aranda.     Pt has OTC braces, she has been wearing on occasion for protection d/t sensitivity at scar.     Pt states that she had L ECTR 2024, her symptoms have resolved since.     Pt arrived 15 minutes late for paperwork.   OT Received On: 24     Precautions:  STEADI Fall Risk Score (The score of 4 or more indicates an increased risk of falling): 0     I have reviewed patients medical history form.   Pain:  Pain Assessment  Pain Assessment: 0-10  0-10 (Numeric) Pain Score: 4  Pain Type: Acute pain  Pain Location: Wrist (volar scar)  Pain Orientation: Right  Pain Radiating Towards: thumb  Pain Frequency: Intermittent (with activity, TTP)    Objective      Home Living: lives alone.       Prior Function: independent;  at [employer].       ADL/IADL: Pt has pain at R hand with activities such as: lifting objects,  "pouring drinks, driving, using a mouse. Pt is completing all other I/ADLs IND.         Therapy/Activity:        Re-check due on: 12/12/24    Exercises: Reps:   AROM Wrist flex/ext, RD/UD, pro/sup   5 finger position   1 x 3 reps, hold 5 sec each     Nerve glides Median nerve 1 x 3 with 5 second hold                    HEP provided to pt on 11/12/24 HEP provided to pt including wrist and hand AROM, and scar management. Pt instructed to complete 4-8x a day, 3-5 reps within pain free range. Handouts provided to pt.    Activities:                    Modalities:                    Manual:    Scar mgmt Instructed pt in scar mgmt techniques including STM (circles, zigzags, drag and pulls) with Vitamin E or substitute.      Instructed pt in various desensitization techniques to complete at home including using various textures to rub against scar. Handout provided to pt.                Functional review:     Completed on: 11/12/24 AROM: wrist        Measurements:   :  Average taken from best 3 measurements.   Trials Average   RUE 46, 61, 57 55   LUE 62, 60, 56 59   *pain during R , discontinued d/t pain     Wrist Measurements:  WFL unless documented below   Flexion  Extension Radial Dev Ulnar Dev Supination Pronation   Right 60 45 10 20 50 80   Left 65 65 35 20 65 80   *Pain with R wrist flexion     Sensation: \"dull\" numbness at volar aspect of R thumb, more so at P2. Hypersensitivity at scar on volar aspect of R wrist. Paraesthesia at base of R thumb with gentle scar massage.         Outcome Measures:   Quickdash Scores: 6.82%    OP EDUCATION:  Education  Individual(s) Educated: Patient  Education Provided: Anatomy & Physiology, Diagnosis & Precautions, Risk and benefits of OT discussed with patient or other, POC discussed and agreed upon  Home Program: AAROM, AROM, Wound/scar care, Handout issued  Risk and Benefits Discussed with Patient/Caregiver/Other: yes  Patient/Caregiver Demonstrated Understanding: " yes  Plan of Care Discussed and Agreed Upon: yes  Patient Response to Education: Patient/Caregiver Verbalized Understanding of Information, Patient/Caregiver Performed Return Demonstration of Exercises/Activities, Patient/Caregiver Asked Appropriate Questions    Assessment:  Pt is a 39 year old female who presents to this facility with performance deficits in R wrist mobility, pain, and scar sensitivity limiting ability to complete ADL and IADL tasks efficiently. Pt  provided with HEP including hand and wrist AROM. Handouts provided to pt. Pt demonstrated understanding. Instructed pt in scar mgmt techniques and in various desensitization techniques to complete at home. Handout provided to pt.        Plan: nerve glides, doorway pec stretches, scar management      OT Plan: Occupational therapy intervention plan to include education/instruction, electrical stimulation, hot pack, ultrasound, manual therapy,  orthotic fitting/training, self-care/home management, therapeutic exercises, therapeutic activities, and home program.  Frequency: 1-2x a week  Duration: 8x weeks    Goals:  Active       OT Goals       OT Goal 1       Start:  11/12/24    Expected End:  02/12/25       LTG - Patient will indicate/ demonstrate the ability to resume all preinjury ADLs and IADLs without significant limits secondary to decreased ROM, decreased strength and/or pain as indicated by Quickdash score of less than 0%.          OT Goal 2       Start:  11/12/24    Expected End:  02/12/25       Develop and issue HEP to help maximize ROM, strength and tolerance to help maximize return to all pre-onset activities.          OT Goal 3       Start:  11/12/24    Expected End:  02/12/25       Pt will be independent in scar management as indicated by soft tissue and flat scar to increase ROM and decrease pain.          OT Goal 4       Start:  11/12/24    Expected End:  02/12/25       Pt will demonstrate increased  strength as appropriate with the R   to be greater than or equal to 80% of the L to help patient resume ADLs and IADLs.          OT Goal 5       Start:  11/12/24    Expected End:  02/12/25       Pain to be less than or equal to 1/10 with greater than or equal to 45 minutes activity.

## 2024-11-13 ENCOUNTER — APPOINTMENT (OUTPATIENT)
Dept: ORTHOPEDIC SURGERY | Facility: CLINIC | Age: 39
End: 2024-11-13
Payer: COMMERCIAL

## 2024-11-18 ENCOUNTER — APPOINTMENT (OUTPATIENT)
Dept: PHARMACY | Facility: HOSPITAL | Age: 39
End: 2024-11-18
Payer: COMMERCIAL

## 2024-11-18 DIAGNOSIS — Z79.899 MEDICATION MANAGEMENT: ICD-10-CM

## 2024-11-18 RX ORDER — TIRZEPATIDE 12.5 MG/.5ML
12.5 INJECTION, SOLUTION SUBCUTANEOUS
Qty: 2 ML | Refills: 0 | Status: SHIPPED | OUTPATIENT
Start: 2024-11-18

## 2024-11-19 ENCOUNTER — TREATMENT (OUTPATIENT)
Dept: OCCUPATIONAL THERAPY | Facility: HOSPITAL | Age: 39
End: 2024-11-19
Payer: COMMERCIAL

## 2024-11-19 DIAGNOSIS — M25.531 WRIST PAIN, ACUTE, RIGHT: ICD-10-CM

## 2024-11-19 DIAGNOSIS — R29.898 WEAKNESS OF RIGHT HAND: ICD-10-CM

## 2024-11-19 DIAGNOSIS — G56.01 CARPAL TUNNEL SYNDROME OF RIGHT WRIST: ICD-10-CM

## 2024-11-19 PROCEDURE — 97110 THERAPEUTIC EXERCISES: CPT | Mod: GO,CO

## 2024-11-19 PROCEDURE — 97140 MANUAL THERAPY 1/> REGIONS: CPT | Mod: GO,CO

## 2024-11-19 ASSESSMENT — PAIN - FUNCTIONAL ASSESSMENT: PAIN_FUNCTIONAL_ASSESSMENT: 0-10

## 2024-11-19 ASSESSMENT — PAIN SCALES - GENERAL: PAINLEVEL_OUTOF10: 3

## 2024-11-19 NOTE — PROGRESS NOTES
Occupational Therapy    OT Treatment    Patient Name: Norah Marquez  MRN: [id number]  Today's Date: 11/19/2024  Visit# 2  Time Calculation  Start Time: 1530  Stop Time: 1630  Time Calculation (min): 60 min             Therapeutic Codes:  OT Therapeutic Procedures Time Entry  Manual Therapy Time Entry: 15  Therapeutic Exercise Time Entry: 45  Current Problem:  1. Carpal tunnel syndrome of right wrist  Follow Up In Occupational Therapy      2. Wrist pain, acute, right  Follow Up In Occupational Therapy      3. Weakness of right hand  Follow Up In Occupational Therapy        Assessment:  Pt did well with HEP exercises and shown a lot of tightness with wrist ext and forearm supination with stretches.     Plan:  Pt to continue with stretches and STM to progress ADL and IADL performances.     Subjective   Pt reports less pain and increase ROM since IE and performing HEP stretches. Pt reports she has notice a better week with use of R hand at work and at home. Pt reports she is going to work from home on days she intends therapy 1x weekly.     Pain:  Pain Assessment  Pain Assessment: 0-10  0-10 (Numeric) Pain Score: 3  Pain Type: Acute pain  Pain Location: Wrist  Pain Orientation: Right  Pain Radiating Towards: thumb    Objective        Re-check due on: 12/12/24    Exercises: Reps:   AROM Wrist flex/ext, RD/UD, pro/sup   5 finger position   1 x 6 reps, hold 5 sec each     Nerve glides Median nerve/ Transverse carpal ligament stretch 1 x 6 with 5 second hold     Prayer stretch 1x 6 reps 10 second holds    Brachial plexus stretch 1x 6 reps 10 second holds           HEP provided to pt on 11/12/24 HEP provided to pt including wrist and hand AROM, and scar management. Pt instructed to complete 4-8x a day, 3-5 reps within pain free range. Handouts provided to pt.    Activities:                    Modalities:                    Manual:    STM Volar palm, PROM wrist ext with digit ext and elbow ext. Thumb ext 15 minutes                     Functional review:     Completed on: 11/12/24          OP EDUCATION:  Education  Individual(s) Educated: Patient  Education Provided: Anatomy & Physiology, Diagnosis & Precautions, Risk and benefits of OT discussed with patient or other, POC discussed and agreed upon  Home Program: SHERLYOM, AROM, Wound/scar care, Handout issued  Risk and Benefits Discussed with Patient/Caregiver/Other: yes  Patient/Caregiver Demonstrated Understanding: yes  Plan of Care Discussed and Agreed Upon: yes  Patient Response to Education: Patient/Caregiver Verbalized Understanding of Information, Patient/Caregiver Performed Return Demonstration of Exercises/Activities, Patient/Caregiver Asked Appropriate Questions    Goals:  Active       OT Goals       OT Goal 1       Start:  11/12/24    Expected End:  02/12/25       LTG - Patient will indicate/ demonstrate the ability to resume all preinjury ADLs and IADLs without significant limits secondary to decreased ROM, decreased strength and/or pain as indicated by Quickdash score of less than 0%.          OT Goal 2       Start:  11/12/24    Expected End:  02/12/25       Develop and issue HEP to help maximize ROM, strength and tolerance to help maximize return to all pre-onset activities.          OT Goal 3       Start:  11/12/24    Expected End:  02/12/25       Pt will be independent in scar management as indicated by soft tissue and flat scar to increase ROM and decrease pain.          OT Goal 4       Start:  11/12/24    Expected End:  02/12/25       Pt will demonstrate increased  strength as appropriate with the R  to be greater than or equal to 80% of the L to help patient resume ADLs and IADLs.          OT Goal 5       Start:  11/12/24    Expected End:  02/12/25       Pain to be less than or equal to 1/10 with greater than or equal to 45 minutes activity.

## 2024-11-25 PROCEDURE — RXMED WILLOW AMBULATORY MEDICATION CHARGE

## 2024-11-26 ENCOUNTER — TREATMENT (OUTPATIENT)
Dept: OCCUPATIONAL THERAPY | Facility: HOSPITAL | Age: 39
End: 2024-11-26
Payer: COMMERCIAL

## 2024-11-26 DIAGNOSIS — M25.531 WRIST PAIN, ACUTE, RIGHT: ICD-10-CM

## 2024-11-26 DIAGNOSIS — R29.898 WEAKNESS OF RIGHT HAND: ICD-10-CM

## 2024-11-26 DIAGNOSIS — G56.01 CARPAL TUNNEL SYNDROME OF RIGHT WRIST: ICD-10-CM

## 2024-11-26 PROCEDURE — 97110 THERAPEUTIC EXERCISES: CPT | Mod: GO,CO

## 2024-11-26 ASSESSMENT — PAIN SCALES - GENERAL: PAINLEVEL_OUTOF10: 6

## 2024-11-26 ASSESSMENT — PAIN - FUNCTIONAL ASSESSMENT: PAIN_FUNCTIONAL_ASSESSMENT: 0-10

## 2024-11-26 NOTE — PROGRESS NOTES
Occupational Therapy    OT Treatment    Patient Name: Norah Marquez  MRN: [id number]  Today's Date: 11/26/2024  Visit# 3  Time Calculation  Start Time: 1515  Stop Time: 1600  Time Calculation (min): 45 min             Therapeutic Codes:  OT Therapeutic Procedures Time Entry  Therapeutic Exercise Time Entry: 45  Current Problem:  1. Carpal tunnel syndrome of right wrist  Follow Up In Occupational Therapy      2. Wrist pain, acute, right  Follow Up In Occupational Therapy      3. Weakness of right hand  Follow Up In Occupational Therapy        Assessment:  Pt reports feeling a lot of stretching with stretches and did very well with technique with stretches. Pt reports no change in pain leaving OT tx session and received HEP sheets for new exercises.     Plan:  Pt to continue with stretches to progress ADL and IADL performances.     Subjective   Pt reports stretches have helped decrease pain and increase ROM, but she accidentally shut her hand in a dresser drawer and pain went up to a 7-8 of 10 this past weekend and patient backed off her stretches a little due to the pain. Pt reports pain has been coming down and she feels she can try to get back into normal routine again with HEP stretches.     Pain:  Pain Assessment  Pain Assessment: 0-10  0-10 (Numeric) Pain Score: 6  Pain Type: Acute pain  Pain Location: Wrist  Pain Orientation: Right  Pain Radiating Towards: thumb    Objective        Re-check due on: 12/12/24    Exercises: Reps:   AROM Wrist flex/ext, RD/UD, pro/sup   5 finger position   1 x 6 reps, hold 5 sec each     Nerve glides Median nerve/ Transverse carpal ligament stretch 1 x 6 with 5 second hold     Prayer stretch 1x 6 reps 10 second holds    Brachial plexus stretch 1x 6 reps 10 second holds    Lateral Epicondylitis type of  stretches  with wrist flexion and ext with elbow ext 1x6 reps and 10 second holds       HEP provided to pt on 11/12/24 HEP provided to pt including wrist and hand AROM, and scar  management. Pt instructed to complete 2-3x a day, 5-10 reps within pain free range. Handouts provided to pt.    Activities:                    Modalities:                    Manual:    STM                    Functional review:     Completed on: 11/12/24            OP EDUCATION:  Education  Individual(s) Educated: Patient  Education Provided: Anatomy & Physiology, Diagnosis & Precautions, Risk and benefits of OT discussed with patient or other, POC discussed and agreed upon  Home Program: AAROM, AROM, Wound/scar care, Handout issued  Risk and Benefits Discussed with Patient/Caregiver/Other: yes  Patient/Caregiver Demonstrated Understanding: yes  Plan of Care Discussed and Agreed Upon: yes  Patient Response to Education: Patient/Caregiver Verbalized Understanding of Information, Patient/Caregiver Performed Return Demonstration of Exercises/Activities, Patient/Caregiver Asked Appropriate Questions  Education Comment:  (Pt performed and educated on brachial plexus and wrist and elbow stretches and added to HEP.)    Goals:  Active       OT Goals       OT Goal 1       Start:  11/12/24    Expected End:  02/12/25       LTG - Patient will indicate/ demonstrate the ability to resume all preinjury ADLs and IADLs without significant limits secondary to decreased ROM, decreased strength and/or pain as indicated by Quickdash score of less than 0%.          OT Goal 2       Start:  11/12/24    Expected End:  02/12/25       Develop and issue HEP to help maximize ROM, strength and tolerance to help maximize return to all pre-onset activities.          OT Goal 3       Start:  11/12/24    Expected End:  02/12/25       Pt will be independent in scar management as indicated by soft tissue and flat scar to increase ROM and decrease pain.          OT Goal 4       Start:  11/12/24    Expected End:  02/12/25       Pt will demonstrate increased  strength as appropriate with the R  to be greater than or equal to 80% of the L to help  patient resume ADLs and IADLs.          OT Goal 5       Start:  11/12/24    Expected End:  02/12/25       Pain to be less than or equal to 1/10 with greater than or equal to 45 minutes activity.

## 2024-11-29 ENCOUNTER — PHARMACY VISIT (OUTPATIENT)
Dept: PHARMACY | Facility: CLINIC | Age: 39
End: 2024-11-29
Payer: MEDICARE

## 2024-12-03 ENCOUNTER — TREATMENT (OUTPATIENT)
Dept: OCCUPATIONAL THERAPY | Facility: HOSPITAL | Age: 39
End: 2024-12-03
Payer: COMMERCIAL

## 2024-12-03 DIAGNOSIS — G56.01 CARPAL TUNNEL SYNDROME OF RIGHT WRIST: ICD-10-CM

## 2024-12-03 DIAGNOSIS — E55.9 VITAMIN D INSUFFICIENCY: ICD-10-CM

## 2024-12-03 DIAGNOSIS — M25.531 WRIST PAIN, ACUTE, RIGHT: ICD-10-CM

## 2024-12-03 DIAGNOSIS — E11.65 POORLY CONTROLLED TYPE 2 DIABETES MELLITUS (MULTI): ICD-10-CM

## 2024-12-03 DIAGNOSIS — R29.898 WEAKNESS OF RIGHT HAND: ICD-10-CM

## 2024-12-03 PROCEDURE — 97110 THERAPEUTIC EXERCISES: CPT | Mod: GO

## 2024-12-03 PROCEDURE — 97530 THERAPEUTIC ACTIVITIES: CPT | Mod: GO

## 2024-12-03 RX ORDER — METFORMIN HYDROCHLORIDE 500 MG/1
1000 TABLET, EXTENDED RELEASE ORAL DAILY
Qty: 180 TABLET | Refills: 1 | Status: SHIPPED | OUTPATIENT
Start: 2024-12-03

## 2024-12-03 RX ORDER — CHOLECALCIFEROL (VITAMIN D3) 25 MCG
TABLET ORAL DAILY
Qty: 180 TABLET | Refills: 1 | Status: SHIPPED | OUTPATIENT
Start: 2024-12-03

## 2024-12-03 ASSESSMENT — PAIN - FUNCTIONAL ASSESSMENT: PAIN_FUNCTIONAL_ASSESSMENT: 0-10

## 2024-12-03 ASSESSMENT — PAIN SCALES - GENERAL: PAINLEVEL_OUTOF10: 0 - NO PAIN

## 2024-12-03 NOTE — PROGRESS NOTES
"Occupational Therapy    OT Treatment/Reassessment    Patient Name: Norah Marquez  MRN: [id number]  Today's Date: 12/3/2024     Time Calculation  Start Time: 1515  Stop Time: 1600  Time Calculation (min): 45 min    Visit Number: 4  Therapeutic Procedures:      OT Therapeutic Procedures Time Entry  Therapeutic Activity Time Entry: 15  Therapeutic Exercise Time Entry: 30                         Current Problem:  1. Carpal tunnel syndrome of right wrist  Follow Up In Occupational Therapy      2. Wrist pain, acute, right  Follow Up In Occupational Therapy      3. Weakness of right hand  Follow Up In Occupational Therapy          Subjective     General: Pt states that she has been \"playing it safe,\" although she feels that she has made progress since her initial evaluation.   OT Received On: 12/03/24     Pain:   Pain Assessment  Pain Assessment: 0-10  0-10 (Numeric) Pain Score: 0 - No pain (0 at rest, pain with activity)    Objective       Therapy/Activity:        Re-check due on: 12/12/24 11/26/24 12/3/24   Exercises: Reps:    AROM Wrist flex/ext, RD/UD, pro/sup   5 finger position   1 x 6 reps, hold 5 sec each      Nerve glides Median nerve/ Transverse carpal ligament stretch 1 x 6 with 5 second hold      Prayer stretch 1x 6 reps 10 second holds Prayer stretch 1x 5 reps 5 second holds    Brachial plexus stretch 1x 6 reps 10 second holds     Lateral Epicondylitis type of  stretches  with wrist flexion and ext with elbow ext 1x6 reps and 10 second holds Lateral Epicondylitis type of  stretches  with wrist flexion and ext with elbow ext 1x5 reps and 10 second holds   PROM  Wrist RD/UD with towel 1 x 5 with 5 second holds    Doorway pec stretches  Horizontal abduction/adduction, external rotation, traction 1 x 5 each with 10 second holds     HEP provided to pt on 11/12/24     Activities:                         Modalities:                         Manual:     STM                        Functional review:      Completed on: " "11/12/24, 12/3/24  Recheck     Measurements: 12/3/24  QuickDash: 4.55%  :  Average taken from best 3 measurements.   Trials Average   RUE 74, 70, 74, 70, 74 74   LUE 60, 63, 65, 65, 71 67    *No pain during assessment    Wrist Measurements:  WFL unless documented below   Flexion  Extension Radial Dev Ulnar Dev Supination Pronation   Right 65 50 15 15 58 88   Left 65 65 35 20 65 80      Sensation: sensitivity at scar if hit, all other symptoms resolved.     Measurements: 11/12/24  :  Average taken from best 3 measurements.   Trials Average   RUE 46, 61, 57 55   LUE 62, 60, 56 59   *pain during R , discontinued d/t pain     Wrist Measurements:  WFL unless documented below   Flexion  Extension Radial Dev Ulnar Dev Supination Pronation   Right 60 45 10 20 50 80   Left 65 65 35 20 65 80   *Pain with R wrist flexion     Sensation: \"dull\" numbness at volar aspect of R thumb, more so at P2. Hypersensitivity at scar on volar aspect of R wrist. Paraesthesia at base of R thumb with gentle scar massage.     OP EDUCATION:  Education  Individual(s) Educated: Patient  Education Provided: Anatomy & Physiology, Diagnosis & Precautions, Risk and benefits of OT discussed with patient or other, POC discussed and agreed upon  Home Program: AAROM, AROM, Wound/scar care, Handout issued    Assessment:   Pt participated in therapeutic exercises and activities as needed to progress RUE mobility and decrease pain. Pt demo'd improved wrist mobility in all areas besides ulnar deviation. Pt also demo'd an impressive increase in  strength since evaluation. Pt reported no pain throughout reassessment. Sensory symptoms have resolved, for the exception of sensitivity at scar when hit. However, pt is still experiencing tightness R hand and scar. Therefore, pt to continue therapy for a few more sessions in order to maximize independence with I/ADLs.     Plan:   Pt to continue addressing RUE mobility, gentle strengthening.    "   Goals:  Active       OT Goals       OT Goal 1 (Progressing)       Start:  11/12/24    Expected End:  02/12/25       LTG - Patient will indicate/ demonstrate the ability to resume all preinjury ADLs and IADLs without significant limits secondary to decreased ROM, decreased strength and/or pain as indicated by Quickdash score of less than 0%.          OT Goal 2 (Progressing)       Start:  11/12/24    Expected End:  02/12/25       Develop and issue HEP to help maximize ROM, strength and tolerance to help maximize return to all pre-onset activities.          OT Goal 3 (Met)       Start:  11/12/24    Expected End:  02/12/25    Resolved:  12/03/24    Pt will be independent in scar management as indicated by soft tissue and flat scar to increase ROM and decrease pain.          OT Goal 4 (Met)       Start:  11/12/24    Expected End:  02/12/25    Resolved:  12/03/24    Pt will demonstrate increased  strength as appropriate with the R  to be greater than or equal to 80% of the L to help patient resume ADLs and IADLs.          OT Goal 5 (Progressing)       Start:  11/12/24    Expected End:  02/12/25       Pain to be less than or equal to 1/10 with greater than or equal to 45 minutes activity.

## 2024-12-09 DIAGNOSIS — M54.50 LOW BACK PAIN, UNSPECIFIED BACK PAIN LATERALITY, UNSPECIFIED CHRONICITY, UNSPECIFIED WHETHER SCIATICA PRESENT: ICD-10-CM

## 2024-12-09 RX ORDER — GABAPENTIN 300 MG/1
300 CAPSULE ORAL NIGHTLY
Qty: 90 CAPSULE | Refills: 1 | Status: SHIPPED | OUTPATIENT
Start: 2024-12-09

## 2024-12-10 ENCOUNTER — TREATMENT (OUTPATIENT)
Dept: OCCUPATIONAL THERAPY | Facility: HOSPITAL | Age: 39
End: 2024-12-10
Payer: COMMERCIAL

## 2024-12-10 DIAGNOSIS — G56.01 CARPAL TUNNEL SYNDROME OF RIGHT WRIST: ICD-10-CM

## 2024-12-10 DIAGNOSIS — M25.531 WRIST PAIN, ACUTE, RIGHT: ICD-10-CM

## 2024-12-10 DIAGNOSIS — R29.898 WEAKNESS OF RIGHT HAND: ICD-10-CM

## 2024-12-10 PROCEDURE — 97110 THERAPEUTIC EXERCISES: CPT | Mod: GO

## 2024-12-10 PROCEDURE — 97140 MANUAL THERAPY 1/> REGIONS: CPT | Mod: GO

## 2024-12-10 PROCEDURE — 97035 APP MDLTY 1+ULTRASOUND EA 15: CPT | Mod: GO

## 2024-12-10 ASSESSMENT — PAIN - FUNCTIONAL ASSESSMENT: PAIN_FUNCTIONAL_ASSESSMENT: 0-10

## 2024-12-10 ASSESSMENT — PAIN SCALES - GENERAL: PAINLEVEL_OUTOF10: 0 - NO PAIN

## 2024-12-10 NOTE — PROGRESS NOTES
Occupational Therapy    OT Treatment    Patient Name: Norah Marquez  MRN: [id number]  Today's Date: 12/10/2024     Time Calculation  Start Time: 1515  Stop Time: 1600  Time Calculation (min): 45 min    Visit Number: 5  Therapeutic Procedures:      OT Therapeutic Procedures Time Entry  Manual Therapy Time Entry: 15  Therapeutic Exercise Time Entry: 20   OT Modalities Time Entry  Ultrasound Time Entry: 10                     Current Problem:  1. Carpal tunnel syndrome of right wrist  Follow Up In Occupational Therapy      2. Wrist pain, acute, right  Follow Up In Occupational Therapy      3. Weakness of right hand  Follow Up In Occupational Therapy          Subjective     General: Pt presents to therapy with no complaints. States that she only has pain when she accidentally hits scar.     OT Received On: 12/10/24     Pain:  Pain Assessment  Pain Assessment: 0-10  0-10 (Numeric) Pain Score: 0 - No pain (TTP at scar)     Objective       Therapy/Activity:         Re-check due on: 12/12/24 11/26/24 12/3/24 12/10/24   Exercises: Reps:     AROM Wrist flex/ext, RD/UD, pro/sup   5 finger position   1 x 6 reps, hold 5 sec each       Nerve glides Median nerve/ Transverse carpal ligament stretch 1 x 6 with 5 second hold       Prayer stretch 1x 6 reps 10 second holds Prayer stretch 1x 5 reps 5 second holds Prayer stretch 1x 5 reps 5 second holds    Brachial plexus stretch 1x 6 reps 10 second holds      Lateral Epicondylitis type of  stretches  with wrist flexion and ext with elbow ext 1x6 reps and 10 second holds Lateral Epicondylitis type of  stretches  with wrist flexion and ext with elbow ext 1x5 reps and 10 second holds Lateral Epicondylitis type of  stretches  with wrist flexion and ext with elbow ext 1x5 reps and 10 second holds   PROM  Wrist RD/UD with towel 1 x 5 with 5 second holds     Doorway pec stretches  Horizontal abduction/adduction, external rotation, traction 1 x 5 each with 10 second holds      HEP provided to  pt on 11/12/24      Activities:                              Modalities:                              Manual:      STM       Ice massage to scar for pain relief and to decrease inflammation and edema.     Instructed pt to complete independently at home.    U/S   X10 min                Functional review:       Completed on: 11/12/24, 12/3/24  Recheck        OP EDUCATION:  Education  Individual(s) Educated: Patient  Education Provided: Anatomy & Physiology, Diagnosis & Precautions, Risk and benefits of OT discussed with patient or other, POC discussed and agreed upon  Home Program: AAROM, AROM, Wound/scar care, Handout issued    Assessment:   Pt participated in therapeutic exercises and activities as needed to decrease pain about R wrist. Pt continued passive stretches of R wrist and elbow with no report of pain. Ultrasound applied to scar to assist with decreasing adhesions. Ice massage to same area for pain relief, as well as inflammation and edema reduction. Instructed pt to complete at home. Pt verbally agreed.      Plan:    Pt to continue addressing RUE mobility, pain relief and gentle strengthening.        Goals:  Active       OT Goals       OT Goal 1 (Progressing)       Start:  11/12/24    Expected End:  02/12/25       LTG - Patient will indicate/ demonstrate the ability to resume all preinjury ADLs and IADLs without significant limits secondary to decreased ROM, decreased strength and/or pain as indicated by Quickdash score of less than 0%.          OT Goal 2 (Progressing)       Start:  11/12/24    Expected End:  02/12/25       Develop and issue HEP to help maximize ROM, strength and tolerance to help maximize return to all pre-onset activities.          OT Goal 3 (Met)       Start:  11/12/24    Expected End:  02/12/25    Resolved:  12/03/24    Pt will be independent in scar management as indicated by soft tissue and flat scar to increase ROM and decrease pain.          OT Goal 4 (Met)       Start:  11/12/24     Expected End:  02/12/25    Resolved:  12/03/24    Pt will demonstrate increased  strength as appropriate with the R  to be greater than or equal to 80% of the L to help patient resume ADLs and IADLs.          OT Goal 5 (Progressing)       Start:  11/12/24    Expected End:  02/12/25       Pain to be less than or equal to 1/10 with greater than or equal to 45 minutes activity.

## 2024-12-13 NOTE — PROGRESS NOTES
Pharmacist Clinic: Diabetes Management  Norah Marquez is a 39 y.o. female was referred to Clinical Pharmacy Team for diabetes management.   Referring Provider: Tal Piña, *  - Last visit with referring provider: 8/12/24    Subjective     HPI    Current Diabetes Pharmacotherapy:    - Jardiance 25 mg daily  - Metformin  mg daily  - Mounjaro 12.5 mg weekly - Mondays    Social History:    Current diet:   - Still thinking about food but doesn't act upon it  - Fasting at times  - D: salad and air fried chicken  - Little bit cohen faster   - Does not crave foods  - Salad every night and chicken  - Cut pop   - Weakness is skittles  - Towards end of the week feels a little more hungry  - Eating everything in moderation    Current exercise:   - Exercising   - Completed a 5k over the weekend    Weight loss:  - Weight last appt: 220 lbs  - Current weight: 210.6 lbs  - Next goal is 200 lbs    Current monitoring regimen:   Patient is using: glucometer  Type of CGM: N/A     Patient is testing blood sugars twice a week    Reported blood sugars:     2-Hours Post Prandial: 78  - Has not went over 100     Any episodes of hypoglycemia? no    Adverse Effects: None    Objective     There were no vitals taken for this visit.    No Known Allergies    Historical Diabetes Pharmacotherapy:  - Ozempic     SECONDARY PREVENTION  - Statin? Yes   - ACE-I/ARB? No  - Aspirin? No    Pertinent PMH Review:  - PMH of Pancreatitis: No  - PMH of Retinopathy: No  - PMH of Urinary Tract Infections: No  - PMH of Yeast Infections: No  - PMH of MTC: No    Lab Review  Lab Results   Component Value Date    BILITOT 0.3 08/07/2024    CALCIUM 8.9 08/07/2024    CO2 24 08/07/2024     (H) 08/07/2024    CREATININE 0.76 08/07/2024    GLUCOSE 94 08/07/2024    ALKPHOS 48 08/07/2024    K 4.0 08/07/2024    PROT 6.6 08/07/2024     08/07/2024    AST 16 08/07/2024    ALT 17 08/07/2024    BUN 13 08/07/2024    ANIONGAP 10 08/07/2024    ALBUMIN  4.2 08/07/2024    GFRF 88 09/20/2023     Lab Results   Component Value Date    TRIG 203 (H) 08/07/2024    CHOL 159 08/07/2024    HDL 37.3 08/07/2024     Lab Results   Component Value Date    HGBA1C 5.8 (H) 08/07/2024    HGBA1C 6.2 (H) 03/14/2024    HGBA1C 6.2 (A) 09/20/2023     The ASCVD Risk score (Brittani DANIEL, et al., 2019) failed to calculate for the following reasons:    The 2019 ASCVD risk score is only valid for ages 40 to 79    Drug Interactions:  None     Affordability/Accessibility:  - Price of Mounjaro will go back up in January   - Ineligible for PAP    Preferred Pharmacy:  [facility]    Assessment/Plan   Problem List Items Addressed This Visit    None  Visit Diagnoses       Medication management        Relevant Medications    tirzepatide (Mounjaro) 12.5 mg/0.5 mL pen injector    Other Relevant Orders    Referral to Clinical Pharmacy            ASSESSMENT:  Patients diabetes is controlled with most recent A1c of 5.8%.     Patient has lost 10 lbs from last visit. Doing excellent. Reports no side effects. Will keep regimen the same and send in 3 month supply of Mounjaro. Will see what cost comes to in new year. She just got a 90 day supply of Metformin. I told her once she finishes bottle (at her request) she can stop the medication completely due to blood sugars all < 100.     PLAN:  CONTINUE current DM medications  Follow up with clinical pharmacist:  2/17/25 @ 9:20   Follow up with PCP: 2/14/25    Thank you,  Melyssa King, PharmD  Clinical Pharmacy Specialist  [phone]  [email or web address]     Continue all meds under the continuation of care with the referring provider and clinical pharmacy team.

## 2024-12-16 ENCOUNTER — APPOINTMENT (OUTPATIENT)
Dept: PHARMACY | Facility: HOSPITAL | Age: 39
End: 2024-12-16
Payer: COMMERCIAL

## 2024-12-16 DIAGNOSIS — Z79.899 MEDICATION MANAGEMENT: ICD-10-CM

## 2024-12-16 DIAGNOSIS — E78.2 MIXED HYPERLIPIDEMIA: ICD-10-CM

## 2024-12-16 DIAGNOSIS — F41.9 ANXIETY: ICD-10-CM

## 2024-12-16 RX ORDER — TIRZEPATIDE 12.5 MG/.5ML
12.5 INJECTION, SOLUTION SUBCUTANEOUS
Qty: 6 ML | Refills: 0 | Status: SHIPPED | OUTPATIENT
Start: 2024-12-16

## 2024-12-17 RX ORDER — ATORVASTATIN CALCIUM 20 MG/1
20 TABLET, FILM COATED ORAL DAILY
Qty: 90 TABLET | Refills: 0 | Status: SHIPPED | OUTPATIENT
Start: 2024-12-17

## 2024-12-17 RX ORDER — FENOFIBRATE 160 MG/1
160 TABLET ORAL DAILY
Qty: 90 TABLET | Refills: 0 | Status: SHIPPED | OUTPATIENT
Start: 2024-12-17

## 2024-12-17 RX ORDER — BUPROPION HYDROCHLORIDE 150 MG/1
TABLET, EXTENDED RELEASE ORAL
Qty: 180 TABLET | Refills: 0 | Status: SHIPPED | OUTPATIENT
Start: 2024-12-17

## 2024-12-19 ENCOUNTER — TREATMENT (OUTPATIENT)
Dept: OCCUPATIONAL THERAPY | Facility: HOSPITAL | Age: 39
End: 2024-12-19
Payer: COMMERCIAL

## 2024-12-19 DIAGNOSIS — G56.01 CARPAL TUNNEL SYNDROME OF RIGHT WRIST: ICD-10-CM

## 2024-12-19 DIAGNOSIS — M25.531 WRIST PAIN, ACUTE, RIGHT: ICD-10-CM

## 2024-12-19 DIAGNOSIS — R29.898 WEAKNESS OF RIGHT HAND: ICD-10-CM

## 2024-12-19 PROCEDURE — 97140 MANUAL THERAPY 1/> REGIONS: CPT | Mod: GO

## 2024-12-19 PROCEDURE — 97110 THERAPEUTIC EXERCISES: CPT | Mod: GO

## 2024-12-19 ASSESSMENT — PAIN SCALES - GENERAL: PAINLEVEL_OUTOF10: 0 - NO PAIN

## 2024-12-19 ASSESSMENT — PAIN - FUNCTIONAL ASSESSMENT: PAIN_FUNCTIONAL_ASSESSMENT: 0-10

## 2024-12-19 NOTE — PROGRESS NOTES
Occupational Therapy    OT Treatment    Patient Name: Norah Marquez  MRN: [id number]  Today's Date: 12/19/2024     Time Calculation  Start Time: 1545  Stop Time: 1630  Time Calculation (min): 45 min    Visit Number: 6  Therapeutic Procedures:      OT Therapeutic Procedures Time Entry  Manual Therapy Time Entry: 30  Therapeutic Exercise Time Entry: 15                         Current Problem:  1. Carpal tunnel syndrome of right wrist  Follow Up In Occupational Therapy      2. Wrist pain, acute, right  Follow Up In Occupational Therapy      3. Weakness of right hand  Follow Up In Occupational Therapy          Subjective     General: Pt states that she is not experiencing any pain at R wrist, only if she accidentally hits it on something.     OT Received On: 12/19/24     Pain:  Pain Assessment  Pain Assessment: 0-10  0-10 (Numeric) Pain Score: 0 - No pain (TTP at scar R wrist)    Objective       Therapy/Activity:         Re-check due on: 12/12/24 12/3/24 12/10/24 12/19/24   Exercises:      AROM      Nerve glides       Prayer stretch 1x 5 reps 5 second holds Prayer stretch 1x 5 reps 5 second holds Prayer stretch 1x 5 reps 5 second holds          Lateral Epicondylitis type of  stretches  with wrist flexion and ext with elbow ext 1x5 reps and 10 second holds Lateral Epicondylitis type of  stretches  with wrist flexion and ext with elbow ext 1x10 reps and 10 second holds Lateral Epicondylitis type of  stretches  with wrist flexion and ext with elbow ext 1x5 reps and 10 second holds   PROM Wrist RD/UD with towel 1 x 5 with 5 second holds      Doorway pec stretches Horizontal abduction/adduction, external rotation, traction 1 x 5 each with 10 second holds       HEP provided to pt on 11/12/24      Activities:                              Modalities:                              Manual:      STM  Ice massage to scar for pain relief and to decrease inflammation and edema.     Instructed pt to complete independently at home.   Ice massage to scar for pain relief and to decrease inflammation and edema.     STM and IASTM with graston to break up adhesions about scar.    U/S  X10 min                 Functional review:       Completed on: 11/12/24, 12/3/24 Recheck         OP EDUCATION:  Education  Home Program: DONG, AROM, Wound/scar care, Handout issued    Assessment:   Pt participated in therapeutic exercises and activities as needed to decrease pain about R wrist. Pt able to tolerate IASTM about R scar with graston to break up adhesions. Continued passive stretching and other modalities for pain relief. Pt reported no pain at end of session.      Plan:    Pt to continue addressing RUE mobility, pain relief and gentle strengthening.      Goals:  Active       OT Goals       OT Goal 1 (Progressing)       Start:  11/12/24    Expected End:  02/12/25       LTG - Patient will indicate/ demonstrate the ability to resume all preinjury ADLs and IADLs without significant limits secondary to decreased ROM, decreased strength and/or pain as indicated by Quickdash score of less than 0%.          OT Goal 2 (Progressing)       Start:  11/12/24    Expected End:  02/12/25       Develop and issue HEP to help maximize ROM, strength and tolerance to help maximize return to all pre-onset activities.          OT Goal 3 (Met)       Start:  11/12/24    Expected End:  02/12/25    Resolved:  12/03/24    Pt will be independent in scar management as indicated by soft tissue and flat scar to increase ROM and decrease pain.          OT Goal 4 (Met)       Start:  11/12/24    Expected End:  02/12/25    Resolved:  12/03/24    Pt will demonstrate increased  strength as appropriate with the R  to be greater than or equal to 80% of the L to help patient resume ADLs and IADLs.          OT Goal 5 (Progressing)       Start:  11/12/24    Expected End:  02/12/25       Pain to be less than or equal to 1/10 with greater than or equal to 45 minutes activity.

## 2024-12-20 PROCEDURE — RXMED WILLOW AMBULATORY MEDICATION CHARGE

## 2024-12-23 ENCOUNTER — DOCUMENTATION (OUTPATIENT)
Dept: OCCUPATIONAL THERAPY | Facility: HOSPITAL | Age: 39
End: 2024-12-23
Payer: COMMERCIAL

## 2024-12-23 ENCOUNTER — APPOINTMENT (OUTPATIENT)
Dept: OCCUPATIONAL THERAPY | Facility: HOSPITAL | Age: 39
End: 2024-12-23
Payer: COMMERCIAL

## 2024-12-23 ENCOUNTER — PHARMACY VISIT (OUTPATIENT)
Dept: PHARMACY | Facility: CLINIC | Age: 39
End: 2024-12-23
Payer: MEDICARE

## 2024-12-23 NOTE — PROGRESS NOTES
Cancel Occupational Therapy                 Therapy Communication Note    Patient Name: Norah Marquez  MRN: [id number]  Department:   Room: Room/bed info not found  Today's Date: 12/23/2024     Discipline: Occupational Therapy      Missed Time: Cancel    Comment:

## 2024-12-26 ENCOUNTER — TREATMENT (OUTPATIENT)
Dept: OCCUPATIONAL THERAPY | Facility: HOSPITAL | Age: 39
End: 2024-12-26
Payer: COMMERCIAL

## 2024-12-26 DIAGNOSIS — R29.898 WEAKNESS OF RIGHT HAND: ICD-10-CM

## 2024-12-26 DIAGNOSIS — M25.531 WRIST PAIN, ACUTE, RIGHT: ICD-10-CM

## 2024-12-26 DIAGNOSIS — G56.01 CARPAL TUNNEL SYNDROME OF RIGHT WRIST: ICD-10-CM

## 2024-12-26 PROCEDURE — 97110 THERAPEUTIC EXERCISES: CPT | Mod: GO

## 2024-12-26 PROCEDURE — 97035 APP MDLTY 1+ULTRASOUND EA 15: CPT | Mod: GO

## 2024-12-26 PROCEDURE — 97140 MANUAL THERAPY 1/> REGIONS: CPT | Mod: GO

## 2024-12-26 ASSESSMENT — PAIN SCALES - GENERAL: PAINLEVEL_OUTOF10: 0 - NO PAIN

## 2024-12-26 ASSESSMENT — PAIN - FUNCTIONAL ASSESSMENT: PAIN_FUNCTIONAL_ASSESSMENT: 0-10

## 2024-12-26 NOTE — PROGRESS NOTES
Occupational Therapy    OT Treatment    Patient Name: Norah Marquez  MRN: [id number]  Today's Date: 12/26/2024     Time Calculation  Start Time: 1730  Stop Time: 1808  Time Calculation (min): 38 min    Visit Number: 7  Therapeutic Procedures:      OT Therapeutic Procedures Time Entry  Manual Therapy Time Entry: 10  Therapeutic Exercise Time Entry: 20   OT Modalities Time Entry  Ultrasound Time Entry: 8                     Current Problem:  1. Carpal tunnel syndrome of right wrist  Follow Up In Occupational Therapy      2. Wrist pain, acute, right  Follow Up In Occupational Therapy      3. Weakness of right hand  Follow Up In Occupational Therapy          Subjective     General: Pt presents with no complaints. Pt states that she completes her HEP whenever she has time.       OT Received On: 12/26/24     Pain:  Pain Assessment  Pain Assessment: 0-10  0-10 (Numeric) Pain Score: 0 - No pain    Objective       Therapy/Activity:         Re-check due on: 12/12/24 12/10/24 12/19/24 12/26/24   Exercises:      AROM      Nerve glides       Prayer stretch 1x 5 reps 5 second holds Prayer stretch 1x 5 reps 5 second holds Prayer stretch 1x 5 reps 5 second holds          Lateral Epicondylitis type of  stretches  with wrist flexion and ext with elbow ext 1x10 reps and 10 second holds Lateral Epicondylitis type of  stretches  with wrist flexion and ext with elbow ext 1x5 reps and 10 second holds lateral Epicondylitis type of  stretches  with wrist flexion and ext with elbow ext 1x5 reps and 10 second holds   PROM      Doorway pec stretches      Yellow theraputty   Gross grasp, alt. opp, abd 2 minutes     HEP provided to pt on 11/12/24      Activities:                              Modalities:                              Manual:      STM Ice massage to scar for pain relief and to decrease inflammation and edema.     Instructed pt to complete independently at home.  Ice massage to scar for pain relief and to decrease inflammation and  edema.     STM and IASTM with graston to break up adhesions about scar.  Ice massage to scar for pain relief and to decrease inflammation and edema.    U/S X10 min   X10 min                Functional review:       Completed on: 11/12/24, 12/3/24          OP EDUCATION:  Education  Individual(s) Educated: Patient  Education Provided: Anatomy & Physiology, Diagnosis & Precautions, Risk and benefits of OT discussed with patient or other, POC discussed and agreed upon  Home Program: AAROM, AROM, Wound/scar care, Handout issued    Assessment:    Pt participated in therapeutic exercises and activities as needed to decrease pain about R wrist. Ultrasound applied to volar aspect of wrist and surrounding structures for pain relief. Instructed pt in gentle hand strengthening with yellow theraputty. Pt able to tolerate without report of pain. Handouts provided to pt. Pt able to complete with min verbal cues. Pt reported no pain at end of session. Pt will most likely be discharged at next therapy session as appropriate.      Plan:   Pt to continue addressing RUE mobility, pain relief and gentle strengthening.      Goals:  Active       OT Goals       OT Goal 1 (Progressing)       Start:  11/12/24    Expected End:  02/12/25       LTG - Patient will indicate/ demonstrate the ability to resume all preinjury ADLs and IADLs without significant limits secondary to decreased ROM, decreased strength and/or pain as indicated by Quickdash score of less than 0%.          OT Goal 2 (Progressing)       Start:  11/12/24    Expected End:  02/12/25       Develop and issue HEP to help maximize ROM, strength and tolerance to help maximize return to all pre-onset activities.          OT Goal 3 (Met)       Start:  11/12/24    Expected End:  02/12/25    Resolved:  12/03/24    Pt will be independent in scar management as indicated by soft tissue and flat scar to increase ROM and decrease pain.          OT Goal 4 (Met)       Start:  11/12/24     Expected End:  02/12/25    Resolved:  12/03/24    Pt will demonstrate increased  strength as appropriate with the R  to be greater than or equal to 80% of the L to help patient resume ADLs and IADLs.          OT Goal 5 (Progressing)       Start:  11/12/24    Expected End:  02/12/25       Pain to be less than or equal to 1/10 with greater than or equal to 45 minutes activity.

## 2024-12-30 ENCOUNTER — APPOINTMENT (OUTPATIENT)
Dept: OCCUPATIONAL THERAPY | Facility: HOSPITAL | Age: 39
End: 2024-12-30
Payer: COMMERCIAL

## 2024-12-30 ENCOUNTER — DOCUMENTATION (OUTPATIENT)
Dept: OCCUPATIONAL THERAPY | Facility: HOSPITAL | Age: 39
End: 2024-12-30
Payer: COMMERCIAL

## 2024-12-30 NOTE — PROGRESS NOTES
Occupational Therapy                 Therapy Communication Note    Patient Name: Norah Marquez  MRN: [id number]  Department:   Room: Room/bed info not found  Today's Date: 12/30/2024     Discipline: Occupational Therapy    Missed Time: Cancel    Comment: No reason given for canceled appointment.

## 2025-01-14 DIAGNOSIS — E11.9 TYPE 2 DIABETES MELLITUS WITHOUT COMPLICATION, WITHOUT LONG-TERM CURRENT USE OF INSULIN (MULTI): ICD-10-CM

## 2025-01-14 RX ORDER — BLOOD-GLUCOSE METER
EACH MISCELLANEOUS
Qty: 100 STRIP | Refills: 3 | Status: SHIPPED | OUTPATIENT
Start: 2025-01-14

## 2025-01-14 RX ORDER — LANCETS 33 GAUGE
EACH MISCELLANEOUS
Qty: 100 EACH | Refills: 3 | Status: SHIPPED | OUTPATIENT
Start: 2025-01-14

## 2025-02-13 LAB
ALBUMIN SERPL-MCNC: 4.6 G/DL (ref 3.6–5.1)
ALBUMIN/CREAT UR: 33 MG/G CREAT
ALP SERPL-CCNC: 44 U/L (ref 31–125)
ALT SERPL-CCNC: 14 U/L (ref 6–29)
ANION GAP SERPL CALCULATED.4IONS-SCNC: 7 MMOL/L (CALC) (ref 7–17)
AST SERPL-CCNC: 14 U/L (ref 10–30)
BASOPHILS # BLD AUTO: 62 CELLS/UL (ref 0–200)
BASOPHILS NFR BLD AUTO: 0.8 %
BILIRUB SERPL-MCNC: 0.4 MG/DL (ref 0.2–1.2)
BUN SERPL-MCNC: 16 MG/DL (ref 7–25)
CALCIUM SERPL-MCNC: 9.1 MG/DL (ref 8.6–10.2)
CHLORIDE SERPL-SCNC: 108 MMOL/L (ref 98–110)
CHOLEST SERPL-MCNC: 188 MG/DL
CHOLEST/HDLC SERPL: 3.8 (CALC)
CO2 SERPL-SCNC: 26 MMOL/L (ref 20–32)
CREAT SERPL-MCNC: 0.82 MG/DL (ref 0.5–0.97)
CREAT UR-MCNC: 82 MG/DL (ref 20–275)
EGFRCR SERPLBLD CKD-EPI 2021: 94 ML/MIN/1.73M2
EOSINOPHIL # BLD AUTO: 146 CELLS/UL (ref 15–500)
EOSINOPHIL NFR BLD AUTO: 1.9 %
ERYTHROCYTE [DISTWIDTH] IN BLOOD BY AUTOMATED COUNT: 12.8 % (ref 11–15)
EST. AVERAGE GLUCOSE BLD GHB EST-MCNC: 117 MG/DL
EST. AVERAGE GLUCOSE BLD GHB EST-SCNC: 6.5 MMOL/L
GLUCOSE SERPL-MCNC: 98 MG/DL (ref 65–99)
HBA1C MFR BLD: 5.7 % OF TOTAL HGB
HCT VFR BLD AUTO: 44.3 % (ref 35–45)
HDLC SERPL-MCNC: 49 MG/DL
HGB BLD-MCNC: 14.6 G/DL (ref 11.7–15.5)
LDLC SERPL CALC-MCNC: 113 MG/DL (CALC)
LYMPHOCYTES # BLD AUTO: 2672 CELLS/UL (ref 850–3900)
LYMPHOCYTES NFR BLD AUTO: 34.7 %
MCH RBC QN AUTO: 30.5 PG (ref 27–33)
MCHC RBC AUTO-ENTMCNC: 33 G/DL (ref 32–36)
MCV RBC AUTO: 92.5 FL (ref 80–100)
MICROALBUMIN UR-MCNC: 2.7 MG/DL
MONOCYTES # BLD AUTO: 570 CELLS/UL (ref 200–950)
MONOCYTES NFR BLD AUTO: 7.4 %
NEUTROPHILS # BLD AUTO: 4250 CELLS/UL (ref 1500–7800)
NEUTROPHILS NFR BLD AUTO: 55.2 %
NONHDLC SERPL-MCNC: 139 MG/DL (CALC)
PLATELET # BLD AUTO: 309 THOUSAND/UL (ref 140–400)
PMV BLD REES-ECKER: 10.4 FL (ref 7.5–12.5)
POTASSIUM SERPL-SCNC: 4.2 MMOL/L (ref 3.5–5.3)
PROT SERPL-MCNC: 7.2 G/DL (ref 6.1–8.1)
RBC # BLD AUTO: 4.79 MILLION/UL (ref 3.8–5.1)
SODIUM SERPL-SCNC: 141 MMOL/L (ref 135–146)
TRIGL SERPL-MCNC: 145 MG/DL
TSH SERPL-ACNC: 1.32 MIU/L
WBC # BLD AUTO: 7.7 THOUSAND/UL (ref 3.8–10.8)

## 2025-02-14 ENCOUNTER — TELEPHONE (OUTPATIENT)
Dept: PRIMARY CARE | Facility: CLINIC | Age: 40
End: 2025-02-14

## 2025-02-14 ENCOUNTER — APPOINTMENT (OUTPATIENT)
Dept: PRIMARY CARE | Facility: CLINIC | Age: 40
End: 2025-02-14
Payer: COMMERCIAL

## 2025-02-14 VITALS
RESPIRATION RATE: 21 BRPM | WEIGHT: 221 LBS | TEMPERATURE: 96.8 F | OXYGEN SATURATION: 99 % | BODY MASS INDEX: 43.39 KG/M2 | HEIGHT: 60 IN | DIASTOLIC BLOOD PRESSURE: 64 MMHG | HEART RATE: 89 BPM | SYSTOLIC BLOOD PRESSURE: 107 MMHG

## 2025-02-14 DIAGNOSIS — F32.A DEPRESSIVE DISORDER: ICD-10-CM

## 2025-02-14 DIAGNOSIS — Z79.899 MEDICATION MANAGEMENT: ICD-10-CM

## 2025-02-14 DIAGNOSIS — E03.9 HYPOTHYROIDISM IN ADULT: ICD-10-CM

## 2025-02-14 DIAGNOSIS — G47.01 INSOMNIA DUE TO MEDICAL CONDITION: ICD-10-CM

## 2025-02-14 DIAGNOSIS — Q60.0 SOLITARY KIDNEY, CONGENITAL: ICD-10-CM

## 2025-02-14 DIAGNOSIS — R80.9 MICROALBUMINURIA: Primary | ICD-10-CM

## 2025-02-14 DIAGNOSIS — M54.50 LOW BACK PAIN, UNSPECIFIED BACK PAIN LATERALITY, UNSPECIFIED CHRONICITY, UNSPECIFIED WHETHER SCIATICA PRESENT: ICD-10-CM

## 2025-02-14 DIAGNOSIS — G47.33 OSA (OBSTRUCTIVE SLEEP APNEA): ICD-10-CM

## 2025-02-14 DIAGNOSIS — J45.909 UNCOMPLICATED ASTHMA, UNSPECIFIED ASTHMA SEVERITY, UNSPECIFIED WHETHER PERSISTENT (HHS-HCC): ICD-10-CM

## 2025-02-14 DIAGNOSIS — F41.9 ANXIETY: ICD-10-CM

## 2025-02-14 DIAGNOSIS — E11.9 TYPE 2 DIABETES MELLITUS WITHOUT COMPLICATION, WITHOUT LONG-TERM CURRENT USE OF INSULIN (MULTI): ICD-10-CM

## 2025-02-14 DIAGNOSIS — E11.42 TYPE 2 DIABETES MELLITUS WITH DIABETIC POLYNEUROPATHY, WITHOUT LONG-TERM CURRENT USE OF INSULIN: ICD-10-CM

## 2025-02-14 DIAGNOSIS — T75.3XXA SEA SICKNESS, INITIAL ENCOUNTER: ICD-10-CM

## 2025-02-14 DIAGNOSIS — E78.2 MIXED HYPERLIPIDEMIA: ICD-10-CM

## 2025-02-14 PROBLEM — F17.210 CIGARETTE SMOKER: Status: RESOLVED | Noted: 2023-02-15 | Resolved: 2025-02-14

## 2025-02-14 PROCEDURE — 3008F BODY MASS INDEX DOCD: CPT | Performed by: INTERNAL MEDICINE

## 2025-02-14 PROCEDURE — 3078F DIAST BP <80 MM HG: CPT | Performed by: INTERNAL MEDICINE

## 2025-02-14 PROCEDURE — 3074F SYST BP LT 130 MM HG: CPT | Performed by: INTERNAL MEDICINE

## 2025-02-14 PROCEDURE — 99214 OFFICE O/P EST MOD 30 MIN: CPT | Performed by: INTERNAL MEDICINE

## 2025-02-14 PROCEDURE — 1036F TOBACCO NON-USER: CPT | Performed by: INTERNAL MEDICINE

## 2025-02-14 RX ORDER — LEVOTHYROXINE SODIUM 75 UG/1
75 TABLET ORAL DAILY
Qty: 90 TABLET | Refills: 1 | Status: SHIPPED | OUTPATIENT
Start: 2025-02-14

## 2025-02-14 RX ORDER — MIRTAZAPINE 15 MG/1
TABLET, FILM COATED ORAL
Qty: 90 TABLET | Refills: 1 | Status: SHIPPED | OUTPATIENT
Start: 2025-02-14

## 2025-02-14 RX ORDER — ATORVASTATIN CALCIUM 20 MG/1
20 TABLET, FILM COATED ORAL DAILY
Qty: 90 TABLET | Refills: 0 | Status: SHIPPED | OUTPATIENT
Start: 2025-02-14

## 2025-02-14 RX ORDER — FENOFIBRATE 160 MG/1
160 TABLET ORAL DAILY
Qty: 90 TABLET | Refills: 0 | Status: SHIPPED | OUTPATIENT
Start: 2025-02-14

## 2025-02-14 RX ORDER — HYDROXYZINE HYDROCHLORIDE 25 MG/1
25 TABLET, FILM COATED ORAL 3 TIMES DAILY PRN
Qty: 90 TABLET | Refills: 1 | Status: SHIPPED | OUTPATIENT
Start: 2025-02-14

## 2025-02-14 RX ORDER — TIRZEPATIDE 12.5 MG/.5ML
12.5 INJECTION, SOLUTION SUBCUTANEOUS
Qty: 6 ML | Refills: 0 | Status: SHIPPED | OUTPATIENT
Start: 2025-02-14

## 2025-02-14 RX ORDER — BUPROPION HYDROCHLORIDE 150 MG/1
150 TABLET, EXTENDED RELEASE ORAL 2 TIMES DAILY
Qty: 180 TABLET | Refills: 1 | Status: CANCELLED | OUTPATIENT
Start: 2025-02-14

## 2025-02-14 RX ORDER — SCOPOLAMINE 1 MG/3D
1 PATCH, EXTENDED RELEASE TRANSDERMAL
Qty: 4 PATCH | Refills: 0 | Status: SHIPPED | OUTPATIENT
Start: 2025-02-14 | End: 2025-04-15

## 2025-02-14 RX ORDER — GABAPENTIN 300 MG/1
300 CAPSULE ORAL NIGHTLY
Qty: 90 CAPSULE | Refills: 1 | Status: SHIPPED | OUTPATIENT
Start: 2025-02-14

## 2025-02-14 RX ORDER — DULOXETIN HYDROCHLORIDE 60 MG/1
60 CAPSULE, DELAYED RELEASE ORAL DAILY
Qty: 90 CAPSULE | Refills: 1 | Status: SHIPPED | OUTPATIENT
Start: 2025-02-14

## 2025-02-14 SDOH — ECONOMIC STABILITY: FOOD INSECURITY: WITHIN THE PAST 12 MONTHS, THE FOOD YOU BOUGHT JUST DIDN'T LAST AND YOU DIDN'T HAVE MONEY TO GET MORE.: NEVER TRUE

## 2025-02-14 SDOH — ECONOMIC STABILITY: FOOD INSECURITY: WITHIN THE PAST 12 MONTHS, YOU WORRIED THAT YOUR FOOD WOULD RUN OUT BEFORE YOU GOT MONEY TO BUY MORE.: NEVER TRUE

## 2025-02-14 ASSESSMENT — PAIN SCALES - GENERAL: PAINLEVEL_OUTOF10: 0-NO PAIN

## 2025-02-14 ASSESSMENT — LIFESTYLE VARIABLES
HOW OFTEN DO YOU HAVE SIX OR MORE DRINKS ON ONE OCCASION: NEVER
SKIP TO QUESTIONS 9-10: 1
HOW OFTEN DO YOU HAVE A DRINK CONTAINING ALCOHOL: MONTHLY OR LESS
AUDIT-C TOTAL SCORE: 1
HOW MANY STANDARD DRINKS CONTAINING ALCOHOL DO YOU HAVE ON A TYPICAL DAY: 1 OR 2

## 2025-02-14 ASSESSMENT — ENCOUNTER SYMPTOMS
LOSS OF SENSATION IN FEET: 0
DEPRESSION: 0

## 2025-02-14 ASSESSMENT — PATIENT HEALTH QUESTIONNAIRE - PHQ9
SUM OF ALL RESPONSES TO PHQ9 QUESTIONS 1 & 2: 0
2. FEELING DOWN, DEPRESSED OR HOPELESS: NOT AT ALL
1. LITTLE INTEREST OR PLEASURE IN DOING THINGS: NOT AT ALL

## 2025-02-14 NOTE — ASSESSMENT & PLAN NOTE
Patient would like to get CPAP re evaluated, patient feels that CPAP is  not needed now since weight loss, will order

## 2025-02-14 NOTE — PROGRESS NOTES
Chief Complaint/HPI:    Follow up:    Depression/Anxiety: Patient is not seen by Psychology nor seeking Counseling. Patient takes mirtazapine, hydroxyzine as needed, duloxetine and bupropion. Patient thinks that she may be able to stop the bupropion now     Insomnia: Patient is currently on Mirtazapine 15 mg. Patient previously smoked 1 pack every 14 days. Quit smoking in June and is wondering if she can come off some of her medications.     SBUC3XT: Patient is currently on Metformin 500 mg BID. Jardiance and Ozempic. Patient has been meeting with clinical pharmacy and things are going well, her weight in the past has been 233 pounds, now 221, she is making a concerted effort to lose weight. Patient notes that the Mounjaro seems to be more effective than the Ozempic. Last A1c is 5.7. Denies nausea, vomiting, changes in bowel movements but does sometimes get abdominal pains in the morning. Patient wonders about getting off of metformin , glucoses have been in the 80 s recently.      CTS: had bilateral carpal tunnel release completed. Tried PT for right wrist pain afterwards, has not followed up with orthopedic surgeon after pain developed.     Hypothyroidism: Patient is currently on 75 mcg Levothyroxine.      HLD: Patient is currently on 20 mg Atorvastatin taken nightly and 160 mg Fenofibrate. Recent , TG improved to 145, total 188. Patient does have a boyfriend now, she states that she eats somewhat differently now than when she used to eat by herself. Patient states that she used to eat more salads.     Microalbuminuria, solitary kidney: Patient was told she only has one kidney on the right 4-5 years ago when she was getting imaging done for hernia evaluation, never had any kidney surgeries. She has not seen a nephrologist before.    ROS otherwise negative aside from what was mentioned above in HPI.      Patient Active Problem List   Diagnosis    Acute neck pain    Acute UTI    Anxiety    Asthma    Bilateral  edema of lower extremity    Chronic sciatica of left side    Cracking skin    Depressive disorder    Eczema    Hypothyroidism in adult    Incisional hernia, without obstruction or gangrene    Loss of libido    Low back pain    Mixed hyperlipidemia    Type 2 diabetes mellitus with diabetic polyneuropathy, without long-term current use of insulin    Body mass index (BMI) 40.0-44.9, adult (Multi)    Obstructive sleep apnea    Palpitations    Primary osteoarthritis of left hip    Right knee pain    S/P abdominal hysterectomy and left salpingo-oophorectomy    Solitary kidney, congenital    Trapezius muscle spasm    Umbilical hernia without obstruction and without gangrene    Vitamin B12 deficiency    Vitamin D insufficiency    Wound, open, leg    Leg swelling    Amenorrhea    Carpal tunnel syndrome, bilateral upper limbs    Knee pain    Symptom of leg swelling    Bilateral carpal tunnel syndrome    Abdominal pain    HCAP (healthcare-associated pneumonia)    Hypokalemia    Sepsis (Multi)    Tachycardia    Torsion of left ovary and ovarian pedicle    Hypoxia    Obesity    DRISS (obstructive sleep apnea)    Carpal tunnel syndrome on left    Cardiac arrhythmia    Peripheral vascular disease (CMS-HCC)    History of obesity    Wrist pain, acute, right    Weakness of right hand    Microalbuminuria    Type 2 diabetes mellitus without complication, without long-term current use of insulin (Multi)         Past Medical History:   Diagnosis Date    Cigarette smoker 02/15/2023    Developmental ovarian cyst     Multiple developmental ovarian cysts    Personal history of other diseases of the musculoskeletal system and connective tissue     History of arthritis    Personal history of other diseases of the musculoskeletal system and connective tissue     History of chronic back pain    Personal history of other diseases of the respiratory system     History of bronchitis    Personal history of other endocrine, nutritional and metabolic  disease     History of morbid obesity    Personal history of pneumonia (recurrent)     History of pneumonia     Past Surgical History:   Procedure Laterality Date    CARPAL TUNNEL RELEASE Right 04/25/2023    CARPAL TUNNEL RELEASE Left 03/2024    OTHER SURGICAL HISTORY  09/10/2019    Laparoscopy    OTHER SURGICAL HISTORY  09/10/2019    Oophorectomy    OTHER SURGICAL HISTORY  12/11/2019    Umbilical hernia repair     Social History     Social History Narrative    Not on file         ALLERGIES  Patient has no known allergies.      MEDICATIONS  Current Outpatient Medications on File Prior to Visit   Medication Sig Dispense Refill    melatonin 10 mg capsule Take 2 capsules (20 mg) by mouth once daily at bedtime. 180 capsule 1    OneTouch Delica Plus Lancet 33 gauge misc USE ONCE DAILY AS DIRECTED 100 each 3    OneTouch Verio test strips strip USE TWICE DAILY AS DIRECTED 100 strip 3    Vitamin B-12 1,000 mcg tablet extended release TAKE ONE TABLET BY MOUTH ONCE DAILY AS DIRECTED. 90 tablet 1    Vitamin D3 25 mcg (1,000 unit) tablet TAKE TWO TABLETS BY MOUTH DAILY 180 tablet 1    [DISCONTINUED] atorvastatin (Lipitor) 20 mg tablet TAKE ONE TABLET BY MOUTH EVERY DAY 90 tablet 0    [DISCONTINUED] buPROPion SR (Wellbutrin SR) 150 mg 12 hr tablet TAKE 1 TABLET BY MOUTH EVERY 12 HOURS. DO NOT CRUSH, CHEW OR SPLIT 180 tablet 0    [DISCONTINUED] DULoxetine (Cymbalta) 60 mg DR capsule TAKE ONE CAPSULE BY MOUTH EVERY DAY 90 capsule 1    [DISCONTINUED] empagliflozin (Jardiance) 25 mg Take 1 tablet (25 mg) by mouth once daily. Take 1 tablet daily, start in 2 weeks 90 tablet 3    [DISCONTINUED] fenofibrate (Triglide) 160 mg tablet TAKE ONE TABLET BY MOUTH EVERY DAY 90 tablet 0    [DISCONTINUED] gabapentin (Neurontin) 300 mg capsule TAKE ONE CAPSULE BY MOUTH AT BEDTIME 90 capsule 1    [DISCONTINUED] hydrOXYzine HCL (Atarax) 25 mg tablet Take 1 tablet (25 mg) by mouth 3 times a day as needed for anxiety. 90 tablet 1    [DISCONTINUED]  levothyroxine (Synthroid, Levoxyl) 75 mcg tablet TAKE ONE TABLET BY MOUTH ONCE DAILY AS DIRECTED 90 tablet 1    [DISCONTINUED] mirtazapine (Remeron) 15 mg tablet TAKE 1 TABLET BY MOUTH NIGHTLY AT BEDTIME. TAKE AN ADDITIONAL TABLET IF 1 TABLET IS NOT EFFECTIVE 90 tablet 1    [DISCONTINUED] tirzepatide (Mounjaro) 12.5 mg/0.5 mL pen injector Inject 12.5 mg under the skin every 7 days. 6 mL 0     No current facility-administered medications on file prior to visit.         PHYSICAL EXAM  /64 (BP Location: Left arm, Patient Position: Sitting, BP Cuff Size: Adult)   Pulse 89   Temp 36 °C (96.8 °F) (Temporal)   Resp 21   Ht 1.524 m (5')   Wt 100 kg (221 lb)   SpO2 99%   BMI 43.16 kg/m²   Body mass index is 43.16 kg/m².    Gen: Alert, NAD,she is morbidly obese, she has significant abdominal obesity, she has lost significant weight  HEENT:  EOMI, conjunctiva and sclera normal in appearance, no thyromegaly or masses, no carotid bruits  Respiratory:  Lungs CTAB  Cardiovascular:  Heart RRR. No M/R/G, no peripheral edema is noted, a few varicosities are noted on the lower extremities  Neuro:  Gross motor and sensory intact  Abdomen: obese  Skin:  No suspicious lesions present on exposed skin          ASSESSMENT/PLAN  Problem List Items Addressed This Visit       Anxiety    Relevant Medications    hydrOXYzine HCL (Atarax) 25 mg tablet    Other Relevant Orders    CBC and Auto Differential    Comprehensive Metabolic Panel    Asthma    Current Assessment & Plan     Stable, no med changes         Relevant Orders    CBC and Auto Differential    Comprehensive Metabolic Panel    Depressive disorder    Current Assessment & Plan     Patient states her depressive symptoms feel controlled and was able to quit smoking last June. She would like to be on less medications.  Will wean bupropion now, follow up as scheduled         Relevant Medications    DULoxetine (Cymbalta) 60 mg DR capsule    mirtazapine (Remeron) 15 mg tablet     Other Relevant Orders    CBC and Auto Differential    Comprehensive Metabolic Panel    Hypothyroidism in adult    Relevant Medications    levothyroxine (Synthroid, Levoxyl) 75 mcg tablet    Other Relevant Orders    CBC and Auto Differential    Comprehensive Metabolic Panel    TSH with reflex to Free T4 if abnormal    Low back pain    Relevant Medications    gabapentin (Neurontin) 300 mg capsule    Other Relevant Orders    CBC and Auto Differential    Comprehensive Metabolic Panel    Microalbuminuria - Primary    Current Assessment & Plan     Patient already on Jardiance 25 mg and h/o solitary kidney, with microalbuminuria ACR at 33. Consider referring to nephrology to establish care for close monitoring and follow up.         Relevant Orders    CBC and Auto Differential    Comprehensive Metabolic Panel    US renal complete    Mixed hyperlipidemia    Current Assessment & Plan     LDL is fair, will not change meds for now, patient continues to lose weight, recheck labs in 6 months, will adjust med therapy at that time if needed         Relevant Medications    atorvastatin (Lipitor) 20 mg tablet    fenofibrate (Triglide) 160 mg tablet    Other Relevant Orders    CBC and Auto Differential    Comprehensive Metabolic Panel    Lipid Panel    DRISS (obstructive sleep apnea)    Current Assessment & Plan     Patient would like to get CPAP re evaluated, patient feels that CPAP is  not needed now since weight loss, will order         Relevant Medications    mirtazapine (Remeron) 15 mg tablet    Other Relevant Orders    In-Center Sleep Study (Non-Sleep Provider Only)    CBC and Auto Differential    Comprehensive Metabolic Panel    Solitary kidney, congenital    Current Assessment & Plan     Recheck US of the kidney, she has not had this checked for some time, check labs         Relevant Orders    CBC and Auto Differential    Comprehensive Metabolic Panel    Vitamin D 25-Hydroxy,Total (for eval of Vitamin D levels)    US renal  complete    Type 2 diabetes mellitus with diabetic polyneuropathy, without long-term current use of insulin    Current Assessment & Plan     Continue seeing clinical pharmacy for management of Mounjaro dosing, and discontinue Metformin.         Relevant Medications    empagliflozin (Jardiance) 25 mg    Other Relevant Orders    Albumin-Creatinine Ratio, Urine Random    CBC and Auto Differential    Comprehensive Metabolic Panel    Hemoglobin A1C    Type 2 diabetes mellitus without complication, without long-term current use of insulin (Multi)    Current Assessment & Plan     Continue seeing clinical pharmacy for management of Mounjaro dosing, and discontinue Metformin.           Relevant Medications    empagliflozin (Jardiance) 25 mg    Other Relevant Orders    CBC and Auto Differential    Comprehensive Metabolic Panel     Other Visit Diagnoses       Medication management        Relevant Medications    tirzepatide (Mounjaro) 12.5 mg/0.5 mL pen injector    Other Relevant Orders    CBC and Auto Differential    Comprehensive Metabolic Panel    Insomnia due to medical condition        Relevant Medications    mirtazapine (Remeron) 15 mg tablet    Other Relevant Orders    CBC and Auto Differential    Comprehensive Metabolic Panel    Sea sickness, initial encounter        Relevant Medications    scopolamine (Transderm-Scop) 1 mg over 3 days patch 3 day    Other Relevant Orders    CBC and Auto Differential    Comprehensive Metabolic Panel        Patient is going on a cruise, a scopolamine patch is ordered to use if needed  Renal US ordered, may taper off of bupropion  Check labs prior to visit in 6 months

## 2025-02-14 NOTE — ASSESSMENT & PLAN NOTE
Patient already on Jardiance 25 mg and h/o solitary kidney, with microalbuminuria ACR at 33. Consider referring to nephrology to establish care for close monitoring and follow up.

## 2025-02-14 NOTE — ASSESSMENT & PLAN NOTE
Patient states her depressive symptoms feel controlled and was able to quit smoking last June. She would like to be on less medications.  Will wean bupropion now, follow up as scheduled

## 2025-02-14 NOTE — ASSESSMENT & PLAN NOTE
LDL is fair, will not change meds for now, patient continues to lose weight, recheck labs in 6 months, will adjust med therapy at that time if needed

## 2025-02-17 ENCOUNTER — APPOINTMENT (OUTPATIENT)
Dept: PHARMACY | Facility: HOSPITAL | Age: 40
End: 2025-02-17
Payer: COMMERCIAL

## 2025-02-17 DIAGNOSIS — E11.42 TYPE 2 DIABETES MELLITUS WITH DIABETIC POLYNEUROPATHY, WITHOUT LONG-TERM CURRENT USE OF INSULIN: Primary | ICD-10-CM

## 2025-02-17 DIAGNOSIS — Z79.899 MEDICATION MANAGEMENT: ICD-10-CM

## 2025-02-17 PROCEDURE — RXMED WILLOW AMBULATORY MEDICATION CHARGE

## 2025-02-17 NOTE — PROGRESS NOTES
Pharmacist Clinic: Diabetes Management  Norah Marquez is a 39 y.o. female was referred to Clinical Pharmacy Team for diabetes management.   Referring Provider: Tal Piña, *  - Last visit with referring provider: 2/14/25    Subjective     HPI    Current Diabetes Pharmacotherapy:    - Jardiance 25 mg daily  - Metformin  mg daily  - Has some left then will stop completely  - Mounjaro 12.5 mg weekly - Mondays    Social History:    Current diet:   - Still thinking about food but doesn't act upon it  - Fasting at times  - D: salad and air fried chicken  - Little bit cohen faster   - Does not crave foods  - Salad every night and chicken  - Cut pop   - Weakness is skittles  - Towards end of the week feels a little more hungry  - Eating everything in moderation    Current exercise:   - Exercising   - Completed a 5k over the weekend    Weight loss:  - Weight last appt: 210.6 lbs  - Current weight: 211.8 lbs   - lowest weight she has gotten down: 206.4 lbs (1/21)  - Next goal is 200 lbs    Current monitoring regimen:   Patient is using: glucometer  Type of CGM: N/A     Patient is testing blood sugars twice a week    Reported blood sugars:     2-Hours Post Prandial: 82, 87, 85     Any episodes of hypoglycemia? no  - Had one episode of 57     Adverse Effects: None    Objective     There were no vitals taken for this visit.    No Known Allergies    Historical Diabetes Pharmacotherapy:  - Ozempic     SECONDARY PREVENTION  - Statin? Yes   - ACE-I/ARB? No  - Aspirin? No    Pertinent PMH Review:  - PMH of Pancreatitis: No  - PMH of Retinopathy: No  - PMH of Urinary Tract Infections: No  - PMH of Yeast Infections: No  - PMH of MTC: No    Lab Review  Lab Results   Component Value Date    BILITOT 0.4 02/12/2025    CALCIUM 9.1 02/12/2025    CO2 26 02/12/2025     02/12/2025    CREATININE 0.82 02/12/2025    GLUCOSE 98 02/12/2025    ALKPHOS 44 02/12/2025    K 4.2 02/12/2025    PROT 7.2 02/12/2025      02/12/2025    AST 14 02/12/2025    ALT 14 02/12/2025    BUN 16 02/12/2025    ANIONGAP 7 02/12/2025    ALBUMIN 4.6 02/12/2025    GFRF 88 09/20/2023     Lab Results   Component Value Date    TRIG 145 02/12/2025    CHOL 188 02/12/2025    HDL 49 (L) 02/12/2025     Lab Results   Component Value Date    HGBA1C 5.7 (H) 02/12/2025    HGBA1C 5.8 (H) 08/07/2024    HGBA1C 6.2 (H) 03/14/2024     The ASCVD Risk score (Brittani DANIEL, et al., 2019) failed to calculate for the following reasons:    The 2019 ASCVD risk score is only valid for ages 40 to 79    Drug Interactions:  None     Affordability/Accessibility:  - Price of Mounjaro will go back up in January   - Ineligible for PAP    Preferred Pharmacy:  [facility]    Assessment/Plan   Problem List Items Addressed This Visit       Type 2 diabetes mellitus with diabetic polyneuropathy, without long-term current use of insulin - Primary    Relevant Orders    Referral to Clinical Pharmacy     Other Visit Diagnoses       Medication management                ASSESSMENT:  Patients diabetes is controlled with most recent A1c of 5.7%.     A1c still at goal. Blood sugars are looking great and running in the 80's. Had one instance of 57 but has not occurred since. She is finishing off her Metformin bottle and will completely stop it after that. She states she has stalled with exercise and diet has worsened a bit but plans to change some things soon. Will continue everything the same and follow up in 2 months.    PLAN:  CONTINUE all DM medications  Follow up with clinical pharmacist:  4/14/25 @ 9:40   Follow up with PCP: 8/22/25    Thank you,  Melyssa King, PharmD  Clinical Pharmacy Specialist  [phone]  [email or web address]     Continue all meds under the continuation of care with the referring provider and clinical pharmacy team.

## 2025-02-18 ENCOUNTER — DOCUMENTATION (OUTPATIENT)
Dept: OCCUPATIONAL THERAPY | Facility: HOSPITAL | Age: 40
End: 2025-02-18
Payer: COMMERCIAL

## 2025-02-18 NOTE — PROGRESS NOTES
Occupational Therapy    Discharge Summary    Name: Norah Marquez  MRN: [id number]  : 1985  Date: 25    Discharge Summary: OT    Discharge Information: Date of discharge 25, Date of last visit 24, Date of evaluation 24, Number of attended visits 7, Referred by Dillon Aranda DO, and Referred for R ECTR    Therapy Summary: Skilled OT services addressed ROM, strength, and HEP to increase independence with ADL and IADL tasks.     Discharge Status: IND per last treatment note     Rehab Discharge Reason: Achieved all and/or the most significant goals(s) and Failed to schedule and/or keep follow-up appointment(s)

## 2025-02-19 ENCOUNTER — PHARMACY VISIT (OUTPATIENT)
Dept: PHARMACY | Facility: CLINIC | Age: 40
End: 2025-02-19
Payer: COMMERCIAL

## 2025-03-07 DIAGNOSIS — E03.9 HYPOTHYROIDISM IN ADULT: ICD-10-CM

## 2025-03-07 PROCEDURE — RXMED WILLOW AMBULATORY MEDICATION CHARGE

## 2025-03-07 RX ORDER — LEVOTHYROXINE SODIUM 75 UG/1
75 TABLET ORAL DAILY
Qty: 90 TABLET | Refills: 0 | Status: SHIPPED | OUTPATIENT
Start: 2025-03-07

## 2025-03-11 ENCOUNTER — PHARMACY VISIT (OUTPATIENT)
Dept: PHARMACY | Facility: CLINIC | Age: 40
End: 2025-03-11
Payer: COMMERCIAL

## 2025-03-20 PROCEDURE — RXMED WILLOW AMBULATORY MEDICATION CHARGE

## 2025-03-24 ENCOUNTER — PHARMACY VISIT (OUTPATIENT)
Dept: PHARMACY | Facility: CLINIC | Age: 40
End: 2025-03-24
Payer: COMMERCIAL

## 2025-03-29 DIAGNOSIS — E53.8 VITAMIN B12 DEFICIENCY: ICD-10-CM

## 2025-03-31 RX ORDER — CHOLECALCIFEROL (VITAMIN D3) 25 MCG
1 TABLET,CHEWABLE ORAL DAILY
Qty: 90 TABLET | Refills: 1 | Status: SHIPPED | OUTPATIENT
Start: 2025-03-31

## 2025-04-07 PROCEDURE — RXMED WILLOW AMBULATORY MEDICATION CHARGE

## 2025-04-10 ENCOUNTER — PHARMACY VISIT (OUTPATIENT)
Dept: PHARMACY | Facility: CLINIC | Age: 40
End: 2025-04-10
Payer: COMMERCIAL

## 2025-04-11 NOTE — PROGRESS NOTES
Pharmacist Clinic: Diabetes Management  Norah Marquez is a 39 y.o. female was referred to Clinical Pharmacy Team for diabetes management.   Referring Provider: Tal Piña, *  - Last visit with referring provider: 25    Subjective     HPI    Current Diabetes Pharmacotherapy:    - Jardiance 25 mg daily  - Has not been taking for ~ 2 months d/t cost   - Mounjaro 12.5 mg weekly -     Social History:    Current diet:   - Still thinking about food but doesn't act upon it  - Fasting at times  - D: salad and air fried chicken  - Little bit cohen faster   - Does not crave foods  - Salad every night and chicken  - Cut pop   - Weakness is skittles  - Towards end of the week feels a little more hungry  - Eating everything in moderation  - Food noise is starting to come back    Current exercise:   - Exercising   - Completed a 5k over the weekend    Weight loss:  - Baseline weight: 221 lbs  - Weight last appt: 211.8 lbs  - Current weight: 208.4 lbs     Current monitoring regimen:   Patient is using: glucometer  Type of CGM: N/A     Patient is testing blood sugars twice a week    Reported blood sugars:     Fastin, highest was 96    Any episodes of hypoglycemia? no  - Had one episode of 57     Adverse Effects: None    Objective     There were no vitals taken for this visit.    No Known Allergies    Historical Diabetes Pharmacotherapy:  - Ozempic  - Metformin (controlled A1c)   - Jardiance (cost)    SECONDARY PREVENTION  - Statin? Yes   - ACE-I/ARB? No  - Aspirin? No    Pertinent PMH Review:  - PMH of Pancreatitis: No  - PMH of Retinopathy: No  - PMH of Urinary Tract Infections: No  - PMH of Yeast Infections: No  - PMH of MTC: No    Lab Review  Lab Results   Component Value Date    BILITOT 0.4 2025    CALCIUM 9.1 2025    CO2 26 2025     2025    CREATININE 0.82 2025    GLUCOSE 98 2025    ALKPHOS 44 2025    K 4.2 2025    PROT 7.2 2025      02/12/2025    AST 14 02/12/2025    ALT 14 02/12/2025    BUN 16 02/12/2025    ANIONGAP 7 02/12/2025    ALBUMIN 4.6 02/12/2025    GFRF 88 09/20/2023     Lab Results   Component Value Date    TRIG 145 02/12/2025    CHOL 188 02/12/2025    HDL 49 (L) 02/12/2025     Lab Results   Component Value Date    HGBA1C 5.7 (H) 02/12/2025    HGBA1C 5.8 (H) 08/07/2024    HGBA1C 6.2 (H) 03/14/2024     The ASCVD Risk score (Brittani DANIEL, et al., 2019) failed to calculate for the following reasons:    The 2019 ASCVD risk score is only valid for ages 40 to 79    Drug Interactions:  None     Affordability/Accessibility:  - Price of Mounjaro will go back up in January   - Ineligible for PAP    Preferred Pharmacy:  [facility]    Assessment/Plan   Problem List Items Addressed This Visit       Type 2 diabetes mellitus with diabetic polyneuropathy, without long-term current use of insulin    Relevant Medications    tirzepatide (Mounjaro) 12.5 mg/0.5 mL pen injector    Other Relevant Orders    Referral to Clinical Pharmacy     Other Visit Diagnoses       Medication management                ASSESSMENT:  Patients diabetes is controlled with most recent A1c of 5.7%.     Patient has lost about 3 lbs since we last spoke 2 months ago. States that food noise is coming back. She has stopped taking Jardiance due to cost. Highest BG is 96 off of med so will have her discontinue Jardiance. At this time she would like to focus on exercise/diet especially with the nice weather coming up. Will keep her at 12.5 mg and consider dose increase in 2 months. Also sent savings card for Mounjaro.    PLAN:  CONTINUE Mounjaro 12.5 mg weekly  STOP Jardiance  Follow up with clinical pharmacist: 6/2/25 @ 9:40  Follow up with PCP: 8/22/25    Thank you,  Melyssa King, PharmD  Clinical Pharmacy Specialist  [phone]  [email or web address]     Continue all meds under the continuation of care with the referring provider and clinical pharmacy team.

## 2025-04-14 ENCOUNTER — APPOINTMENT (OUTPATIENT)
Dept: PHARMACY | Facility: HOSPITAL | Age: 40
End: 2025-04-14
Payer: COMMERCIAL

## 2025-04-14 DIAGNOSIS — E11.42 TYPE 2 DIABETES MELLITUS WITH DIABETIC POLYNEUROPATHY, WITHOUT LONG-TERM CURRENT USE OF INSULIN: ICD-10-CM

## 2025-04-14 DIAGNOSIS — Z79.899 MEDICATION MANAGEMENT: ICD-10-CM

## 2025-04-14 RX ORDER — TIRZEPATIDE 12.5 MG/.5ML
12.5 INJECTION, SOLUTION SUBCUTANEOUS
Qty: 2 ML | Refills: 2 | Status: SHIPPED | OUTPATIENT
Start: 2025-04-14

## 2025-04-23 PROCEDURE — RXMED WILLOW AMBULATORY MEDICATION CHARGE

## 2025-04-24 ENCOUNTER — PHARMACY VISIT (OUTPATIENT)
Dept: PHARMACY | Facility: CLINIC | Age: 40
End: 2025-04-24
Payer: COMMERCIAL

## 2025-04-30 PROCEDURE — RXMED WILLOW AMBULATORY MEDICATION CHARGE

## 2025-05-01 ENCOUNTER — PHARMACY VISIT (OUTPATIENT)
Dept: PHARMACY | Facility: CLINIC | Age: 40
End: 2025-05-01
Payer: COMMERCIAL

## 2025-05-05 ENCOUNTER — TELEPHONE (OUTPATIENT)
Dept: PRIMARY CARE | Facility: CLINIC | Age: 40
End: 2025-05-05
Payer: COMMERCIAL

## 2025-05-05 NOTE — TELEPHONE ENCOUNTER
Patient called and stated she is having right side pain and is itchy. She only has one kidney and is not sure if there is anything she should do or you could suggest.  She has been experiencing the issue since Saturday.

## 2025-05-20 PROCEDURE — RXMED WILLOW AMBULATORY MEDICATION CHARGE

## 2025-05-22 ENCOUNTER — PHARMACY VISIT (OUTPATIENT)
Dept: PHARMACY | Facility: CLINIC | Age: 40
End: 2025-05-22
Payer: COMMERCIAL

## 2025-05-23 ENCOUNTER — PHARMACY VISIT (OUTPATIENT)
Dept: PHARMACY | Facility: CLINIC | Age: 40
End: 2025-05-23
Payer: COMMERCIAL

## 2025-05-28 DIAGNOSIS — F41.9 ANXIETY: ICD-10-CM

## 2025-05-28 PROCEDURE — RXMED WILLOW AMBULATORY MEDICATION CHARGE

## 2025-05-28 RX ORDER — LIDOCAINE 50 MG/G
1 PATCH TOPICAL DAILY
COMMUNITY
Start: 2025-05-12

## 2025-05-28 RX ORDER — METHOCARBAMOL 500 MG/1
1 TABLET, FILM COATED ORAL
COMMUNITY
Start: 2025-05-06

## 2025-05-28 RX ORDER — HYDROXYZINE HYDROCHLORIDE 25 MG/1
25 TABLET, FILM COATED ORAL 3 TIMES DAILY PRN
Qty: 90 TABLET | Refills: 1 | Status: SHIPPED | OUTPATIENT
Start: 2025-05-28

## 2025-05-28 RX ORDER — ACETAMINOPHEN 500 MG
500 TABLET ORAL EVERY 6 HOURS PRN
COMMUNITY
Start: 2025-05-06

## 2025-05-30 ENCOUNTER — PHARMACY VISIT (OUTPATIENT)
Dept: PHARMACY | Facility: CLINIC | Age: 40
End: 2025-05-30
Payer: COMMERCIAL

## 2025-05-30 NOTE — PROGRESS NOTES
Pharmacist Clinic: Diabetes Management  Norah Marquez is a 40 y.o. female was referred to Clinical Pharmacy Team for diabetes management.   Referring Provider: Tal Piña, *  - Last visit with referring provider: 25    Subjective     HPI    Current Diabetes Pharmacotherapy:    - Mounjaro 12.5 mg weekly -     Social History:    Current diet:   - Still thinking about food but doesn't act upon it  - Fasting at times  - D: salad and air fried chicken  - Little bit cohen faster   - Does not crave foods  - Salad every night and chicken  - Cut pop   - Weakness is skittles  - Towards end of the week feels a little more hungry  - Eating everything in moderation  - Food noise is starting to come back    Current exercise:   - Exercising   - Completed a 5k over the weekend  - Also completed another 5k     Weight loss:  - Baseline weight: 221 lbs  - Weight last appt: 208.4 lbs  - Current weight: 213 lbs     Current monitoring regimen:   Patient is using: glucometer  Type of CGM: N/A     Patient is testing blood sugars twice a week    Reported blood sugars:     Fastin (highest), low 90's    2 Hr PP: 100     Any episodes of hypoglycemia? no    Adverse Effects: None    Objective     There were no vitals taken for this visit.    No Known Allergies    Historical Diabetes Pharmacotherapy:  - Ozempic  - Metformin (controlled A1c)   - Jardiance (cost)    SECONDARY PREVENTION  - Statin? Yes   - ACE-I/ARB? No  - Aspirin? No    Pertinent PMH Review:  - PMH of Pancreatitis: No  - PMH of Retinopathy: No  - PMH of Urinary Tract Infections: No  - PMH of Yeast Infections: No  - PMH of MTC: No    Lab Review  Lab Results   Component Value Date    BILITOT 0.4 2025    CALCIUM 9.1 2025    CO2 26 2025     2025    CREATININE 0.82 2025    GLUCOSE 98 2025    ALKPHOS 44 2025    K 4.2 2025    PROT 7.2 2025     2025    AST 14 2025    ALT 14  02/12/2025    BUN 16 02/12/2025    ANIONGAP 7 02/12/2025    ALBUMIN 4.6 02/12/2025    GFRF 88 09/20/2023     Lab Results   Component Value Date    EGFR 94 02/12/2025      ALBUMIN/CREATININE RATIO, RANDOM URINE   Date Value Ref Range Status   02/12/2025 33 (H) <30 mg/g creat Final     Comment:        The ADA defines abnormalities in albumin  excretion as follows:     Albuminuria Category        Result (mg/g creatinine)     Normal to Mildly increased   <30  Moderately increased            Severely increased           > OR = 300     The ADA recommends that at least two of three  specimens collected within a 3-6 month period be  abnormal before considering a patient to be  within a diagnostic category.       Albumin/Creatinine Ratio   Date Value Ref Range Status   08/07/2024 40.2 (H) <30.0 ug/mg Creat Final     Albumin/Creatine Ratio   Date Value Ref Range Status   09/20/2023 49.8 (H) 0.0 - 30.0 ug/mg crt Final     Lab Results   Component Value Date    TRIG 145 02/12/2025    CHOL 188 02/12/2025    HDL 49 (L) 02/12/2025     Lab Results   Component Value Date    HGBA1C 5.7 (H) 02/12/2025    HGBA1C 5.8 (H) 08/07/2024    HGBA1C 6.2 (H) 03/14/2024     The ASCVD Risk score (Brittani DK, et al., 2019) failed to calculate for the following reasons:    The 2019 ASCVD risk score is only valid for ages 40 to 79    Drug Interactions:  None     Affordability/Accessibility:  - Price of Mounjaro will go back up in January   - Ineligible for PAP    Preferred Pharmacy:  [facility]    Assessment/Plan   Problem List Items Addressed This Visit       Type 2 diabetes mellitus with diabetic polyneuropathy, without long-term current use of insulin    Relevant Medications    tirzepatide (Mounjaro) 15 mg/0.5 mL pen injector (Start on 6/9/2025)    Other Relevant Orders    Referral to Clinical Pharmacy       ASSESSMENT:  Patients diabetes is controlled with most recent A1c of 5.7%.     Patient's weight has increased since last visit. She  states that she has been running more 5K's lately and hiking. No side effects. Today we will increase Mounjaro to 15 mg.     PLAN:  INCREASE Mounjaro to 15 mg weekly   Follow up with clinical pharmacist: 7/14/25 @ 9:40  Follow up with PCP: 8/22/25    Thank you,  Melyssa King, PharmD  Clinical Pharmacy Specialist  [phone]  [email or web address]     Continue all meds under the continuation of care with the referring provider and clinical pharmacy team.

## 2025-06-02 ENCOUNTER — APPOINTMENT (OUTPATIENT)
Dept: PHARMACY | Facility: HOSPITAL | Age: 40
End: 2025-06-02
Payer: COMMERCIAL

## 2025-06-02 DIAGNOSIS — E11.42 TYPE 2 DIABETES MELLITUS WITH DIABETIC POLYNEUROPATHY, WITHOUT LONG-TERM CURRENT USE OF INSULIN: ICD-10-CM

## 2025-06-02 RX ORDER — TIRZEPATIDE 15 MG/.5ML
15 INJECTION, SOLUTION SUBCUTANEOUS
Qty: 2 ML | Refills: 4 | Status: SHIPPED | OUTPATIENT
Start: 2025-06-09

## 2025-06-14 DIAGNOSIS — E78.2 MIXED HYPERLIPIDEMIA: ICD-10-CM

## 2025-06-14 PROCEDURE — RXMED WILLOW AMBULATORY MEDICATION CHARGE

## 2025-06-14 RX ORDER — ATORVASTATIN CALCIUM 20 MG/1
20 TABLET, FILM COATED ORAL DAILY
Qty: 90 TABLET | Refills: 0 | Status: CANCELLED | OUTPATIENT
Start: 2025-06-14

## 2025-06-16 ENCOUNTER — TELEPHONE (OUTPATIENT)
Dept: PRIMARY CARE | Facility: CLINIC | Age: 40
End: 2025-06-16
Payer: COMMERCIAL

## 2025-06-16 DIAGNOSIS — J06.9 ACUTE URI: Primary | ICD-10-CM

## 2025-06-16 DIAGNOSIS — E78.2 MIXED HYPERLIPIDEMIA: ICD-10-CM

## 2025-06-16 RX ORDER — ATORVASTATIN CALCIUM 20 MG/1
20 TABLET, FILM COATED ORAL DAILY
Qty: 90 TABLET | Refills: 0 | Status: SHIPPED | OUTPATIENT
Start: 2025-06-16

## 2025-06-16 RX ORDER — ATORVASTATIN CALCIUM 20 MG/1
20 TABLET, FILM COATED ORAL DAILY
Qty: 90 TABLET | Refills: 0 | Status: CANCELLED | OUTPATIENT
Start: 2025-06-14

## 2025-06-16 NOTE — TELEPHONE ENCOUNTER
How long have you been sick? A WEEK  Cough (productive or nonproductive)? YES  Color of phlegm? YELLOW   Sinus pain? No  Sinus drainage/color? No  Earache? no  Congestion? Yes   Headache? no  Fever (if yes, temp)? No   Sore throat? When it first started   Short of breath/wheezing? Little bit   OTC medication? Mucinex, benadryl   Please advise

## 2025-06-17 ENCOUNTER — PHARMACY VISIT (OUTPATIENT)
Dept: PHARMACY | Facility: CLINIC | Age: 40
End: 2025-06-17
Payer: COMMERCIAL

## 2025-06-17 PROCEDURE — RXMED WILLOW AMBULATORY MEDICATION CHARGE

## 2025-06-17 RX ORDER — AZITHROMYCIN 500 MG/1
500 TABLET, FILM COATED ORAL DAILY
Qty: 5 TABLET | Refills: 0 | Status: SHIPPED | OUTPATIENT
Start: 2025-06-17 | End: 2025-06-22

## 2025-06-23 PROCEDURE — RXMED WILLOW AMBULATORY MEDICATION CHARGE

## 2025-06-26 ENCOUNTER — PHARMACY VISIT (OUTPATIENT)
Dept: PHARMACY | Facility: CLINIC | Age: 40
End: 2025-06-26
Payer: COMMERCIAL

## 2025-07-06 DIAGNOSIS — E78.2 MIXED HYPERLIPIDEMIA: ICD-10-CM

## 2025-07-07 PROCEDURE — RXMED WILLOW AMBULATORY MEDICATION CHARGE

## 2025-07-07 RX ORDER — FENOFIBRATE 160 MG/1
160 TABLET ORAL DAILY
Qty: 90 TABLET | Refills: 0 | Status: SHIPPED | OUTPATIENT
Start: 2025-07-07

## 2025-07-10 ENCOUNTER — PHARMACY VISIT (OUTPATIENT)
Dept: PHARMACY | Facility: CLINIC | Age: 40
End: 2025-07-10
Payer: COMMERCIAL

## 2025-07-14 ENCOUNTER — APPOINTMENT (OUTPATIENT)
Dept: PHARMACY | Facility: HOSPITAL | Age: 40
End: 2025-07-14
Payer: COMMERCIAL

## 2025-07-14 DIAGNOSIS — Q60.0 SOLITARY KIDNEY, CONGENITAL: ICD-10-CM

## 2025-07-14 DIAGNOSIS — E78.2 MIXED HYPERLIPIDEMIA: ICD-10-CM

## 2025-07-14 DIAGNOSIS — M54.50 LOW BACK PAIN, UNSPECIFIED BACK PAIN LATERALITY, UNSPECIFIED CHRONICITY, UNSPECIFIED WHETHER SCIATICA PRESENT: ICD-10-CM

## 2025-07-14 DIAGNOSIS — T75.3XXA SEA SICKNESS, INITIAL ENCOUNTER: ICD-10-CM

## 2025-07-14 DIAGNOSIS — G47.01 INSOMNIA DUE TO MEDICAL CONDITION: ICD-10-CM

## 2025-07-14 DIAGNOSIS — R80.9 MICROALBUMINURIA: ICD-10-CM

## 2025-07-14 DIAGNOSIS — E11.42 TYPE 2 DIABETES MELLITUS WITH DIABETIC POLYNEUROPATHY, WITHOUT LONG-TERM CURRENT USE OF INSULIN: ICD-10-CM

## 2025-07-14 DIAGNOSIS — J45.909 UNCOMPLICATED ASTHMA, UNSPECIFIED ASTHMA SEVERITY, UNSPECIFIED WHETHER PERSISTENT (HHS-HCC): ICD-10-CM

## 2025-07-14 DIAGNOSIS — E03.9 HYPOTHYROIDISM IN ADULT: ICD-10-CM

## 2025-07-14 DIAGNOSIS — F32.A DEPRESSIVE DISORDER: ICD-10-CM

## 2025-07-14 DIAGNOSIS — Z79.899 MEDICATION MANAGEMENT: ICD-10-CM

## 2025-07-14 DIAGNOSIS — G47.33 OSA (OBSTRUCTIVE SLEEP APNEA): ICD-10-CM

## 2025-07-14 DIAGNOSIS — E11.9 TYPE 2 DIABETES MELLITUS WITHOUT COMPLICATION, WITHOUT LONG-TERM CURRENT USE OF INSULIN: ICD-10-CM

## 2025-07-14 DIAGNOSIS — F41.9 ANXIETY: ICD-10-CM

## 2025-07-16 PROCEDURE — RXMED WILLOW AMBULATORY MEDICATION CHARGE

## 2025-07-18 ENCOUNTER — PHARMACY VISIT (OUTPATIENT)
Dept: PHARMACY | Facility: CLINIC | Age: 40
End: 2025-07-18
Payer: COMMERCIAL

## 2025-07-28 ENCOUNTER — TELEPHONE (OUTPATIENT)
Dept: PRIMARY CARE | Facility: CLINIC | Age: 40
End: 2025-07-28
Payer: COMMERCIAL

## 2025-07-31 ENCOUNTER — DOCUMENTATION (OUTPATIENT)
Dept: PHARMACY | Facility: HOSPITAL | Age: 40
End: 2025-07-31
Payer: COMMERCIAL

## 2025-07-31 DIAGNOSIS — G47.33 OSA (OBSTRUCTIVE SLEEP APNEA): Primary | ICD-10-CM

## 2025-07-31 DIAGNOSIS — G47.01 INSOMNIA DUE TO MEDICAL CONDITION: ICD-10-CM

## 2025-07-31 NOTE — PROGRESS NOTES
Cancelled Clinical Pharmacy Referral:    Referral to clinical pharmacy specialist has been cancelled for Norah LILIANE Marquez due to scheduling team unable to contact patient for follow up appointment. If patient wishes to be re-seen, PCP is to reach out to me for referral to be reinstated or a new referral must be placed.    Thank you,  Melyssa King, PharmD  Clinical Pharmacy Specialist - Primary Care  [phone]  [email or web address]

## 2025-08-01 PROCEDURE — RXMED WILLOW AMBULATORY MEDICATION CHARGE

## 2025-08-01 RX ORDER — MIRTAZAPINE 15 MG/1
15 TABLET, FILM COATED ORAL NIGHTLY
Qty: 90 TABLET | Refills: 1 | Status: SHIPPED | OUTPATIENT
Start: 2025-08-01

## 2025-08-02 PROCEDURE — RXMED WILLOW AMBULATORY MEDICATION CHARGE

## 2025-08-05 ENCOUNTER — PHARMACY VISIT (OUTPATIENT)
Dept: PHARMACY | Facility: CLINIC | Age: 40
End: 2025-08-05
Payer: COMMERCIAL

## 2025-08-14 DIAGNOSIS — F41.9 ANXIETY: ICD-10-CM

## 2025-08-14 RX ORDER — HYDROXYZINE HYDROCHLORIDE 25 MG/1
25 TABLET, FILM COATED ORAL 3 TIMES DAILY PRN
Qty: 90 TABLET | Refills: 1 | Status: CANCELLED | OUTPATIENT
Start: 2025-08-14

## 2025-08-15 DIAGNOSIS — F41.9 ANXIETY: ICD-10-CM

## 2025-08-15 PROCEDURE — RXMED WILLOW AMBULATORY MEDICATION CHARGE

## 2025-08-15 RX ORDER — HYDROXYZINE HYDROCHLORIDE 25 MG/1
25 TABLET, FILM COATED ORAL 3 TIMES DAILY PRN
Qty: 90 TABLET | Refills: 1 | Status: SHIPPED | OUTPATIENT
Start: 2025-08-15

## 2025-08-18 ENCOUNTER — PHARMACY VISIT (OUTPATIENT)
Dept: PHARMACY | Facility: CLINIC | Age: 40
End: 2025-08-18
Payer: COMMERCIAL

## 2025-08-22 ENCOUNTER — APPOINTMENT (OUTPATIENT)
Dept: PRIMARY CARE | Facility: CLINIC | Age: 40
End: 2025-08-22
Payer: COMMERCIAL

## 2025-08-22 VITALS
DIASTOLIC BLOOD PRESSURE: 78 MMHG | SYSTOLIC BLOOD PRESSURE: 108 MMHG | WEIGHT: 220.3 LBS | HEIGHT: 60 IN | OXYGEN SATURATION: 97 % | HEART RATE: 100 BPM | TEMPERATURE: 97.2 F | BODY MASS INDEX: 43.25 KG/M2 | RESPIRATION RATE: 20 BRPM

## 2025-08-22 DIAGNOSIS — Q60.0 SOLITARY KIDNEY, CONGENITAL: ICD-10-CM

## 2025-08-22 DIAGNOSIS — E53.8 VITAMIN B12 DEFICIENCY: ICD-10-CM

## 2025-08-22 DIAGNOSIS — E78.2 MIXED HYPERLIPIDEMIA: Primary | ICD-10-CM

## 2025-08-22 DIAGNOSIS — E55.9 VITAMIN D INSUFFICIENCY: ICD-10-CM

## 2025-08-22 DIAGNOSIS — E03.9 HYPOTHYROIDISM IN ADULT: ICD-10-CM

## 2025-08-22 DIAGNOSIS — E11.42 TYPE 2 DIABETES MELLITUS WITH DIABETIC POLYNEUROPATHY, WITHOUT LONG-TERM CURRENT USE OF INSULIN: ICD-10-CM

## 2025-08-22 LAB
25(OH)D3+25(OH)D2 SERPL-MCNC: 33 NG/ML (ref 30–100)
ALBUMIN SERPL-MCNC: 4.4 G/DL (ref 3.6–5.1)
ALBUMIN/CREAT UR: 12 MG/G CREAT
ALP SERPL-CCNC: 41 U/L (ref 31–125)
ALT SERPL-CCNC: 14 U/L (ref 6–29)
ANION GAP SERPL CALCULATED.4IONS-SCNC: 9 MMOL/L (CALC) (ref 7–17)
AST SERPL-CCNC: 13 U/L (ref 10–30)
BASOPHILS # BLD AUTO: 59 CELLS/UL (ref 0–200)
BASOPHILS NFR BLD AUTO: 1 %
BILIRUB SERPL-MCNC: 0.3 MG/DL (ref 0.2–1.2)
BUN SERPL-MCNC: 16 MG/DL (ref 7–25)
CALCIUM SERPL-MCNC: 9.2 MG/DL (ref 8.6–10.2)
CHLORIDE SERPL-SCNC: 108 MMOL/L (ref 98–110)
CHOLEST SERPL-MCNC: 184 MG/DL
CHOLEST/HDLC SERPL: 3.8 (CALC)
CO2 SERPL-SCNC: 23 MMOL/L (ref 20–32)
CREAT SERPL-MCNC: 0.76 MG/DL (ref 0.5–0.97)
CREAT UR-MCNC: 128 MG/DL (ref 20–275)
EGFRCR SERPLBLD CKD-EPI 2021: 102 ML/MIN/1.73M2
EOSINOPHIL # BLD AUTO: 260 CELLS/UL (ref 15–500)
EOSINOPHIL NFR BLD AUTO: 4.4 %
ERYTHROCYTE [DISTWIDTH] IN BLOOD BY AUTOMATED COUNT: 13.1 % (ref 11–15)
EST. AVERAGE GLUCOSE BLD GHB EST-MCNC: 111 MG/DL
EST. AVERAGE GLUCOSE BLD GHB EST-SCNC: 6.2 MMOL/L
GLUCOSE SERPL-MCNC: 94 MG/DL (ref 65–99)
HBA1C MFR BLD: 5.5 %
HCT VFR BLD AUTO: 41.3 % (ref 35–45)
HDLC SERPL-MCNC: 48 MG/DL
HGB BLD-MCNC: 13.4 G/DL (ref 11.7–15.5)
LDLC SERPL CALC-MCNC: 108 MG/DL (CALC)
LYMPHOCYTES # BLD AUTO: 2360 CELLS/UL (ref 850–3900)
LYMPHOCYTES NFR BLD AUTO: 40 %
MCH RBC QN AUTO: 30.5 PG (ref 27–33)
MCHC RBC AUTO-ENTMCNC: 32.4 G/DL (ref 32–36)
MCV RBC AUTO: 93.9 FL (ref 80–100)
MICROALBUMIN UR-MCNC: 1.5 MG/DL
MONOCYTES # BLD AUTO: 555 CELLS/UL (ref 200–950)
MONOCYTES NFR BLD AUTO: 9.4 %
NEUTROPHILS # BLD AUTO: 2667 CELLS/UL (ref 1500–7800)
NEUTROPHILS NFR BLD AUTO: 45.2 %
NONHDLC SERPL-MCNC: 136 MG/DL (CALC)
PLATELET # BLD AUTO: 319 THOUSAND/UL (ref 140–400)
PMV BLD REES-ECKER: 9.7 FL (ref 7.5–12.5)
POTASSIUM SERPL-SCNC: 4.4 MMOL/L (ref 3.5–5.3)
PROT SERPL-MCNC: 7 G/DL (ref 6.1–8.1)
RBC # BLD AUTO: 4.4 MILLION/UL (ref 3.8–5.1)
SODIUM SERPL-SCNC: 140 MMOL/L (ref 135–146)
TRIGL SERPL-MCNC: 165 MG/DL
TSH SERPL-ACNC: 2.52 MIU/L
WBC # BLD AUTO: 5.9 THOUSAND/UL (ref 3.8–10.8)

## 2025-08-22 PROCEDURE — 99214 OFFICE O/P EST MOD 30 MIN: CPT | Performed by: INTERNAL MEDICINE

## 2025-08-22 PROCEDURE — RXMED WILLOW AMBULATORY MEDICATION CHARGE

## 2025-08-22 PROCEDURE — 3074F SYST BP LT 130 MM HG: CPT | Performed by: INTERNAL MEDICINE

## 2025-08-22 PROCEDURE — 3078F DIAST BP <80 MM HG: CPT | Performed by: INTERNAL MEDICINE

## 2025-08-22 PROCEDURE — 3008F BODY MASS INDEX DOCD: CPT | Performed by: INTERNAL MEDICINE

## 2025-08-22 RX ORDER — VIT C/E/ZN/COPPR/LUTEIN/ZEAXAN 250MG-90MG
1 CAPSULE ORAL DAILY
Qty: 90 TABLET | Refills: 1 | Status: SHIPPED | OUTPATIENT
Start: 2025-08-22

## 2025-08-22 RX ORDER — CHOLECALCIFEROL (VITAMIN D3) 25 MCG
25 TABLET ORAL DAILY
Qty: 180 TABLET | Refills: 1 | Status: SHIPPED | OUTPATIENT
Start: 2025-08-22

## 2025-08-22 RX ORDER — OMEPRAZOLE 40 MG/1
40 CAPSULE, DELAYED RELEASE ORAL
COMMUNITY
Start: 2025-08-11

## 2025-08-22 SDOH — ECONOMIC STABILITY: FOOD INSECURITY: WITHIN THE PAST 12 MONTHS, YOU WORRIED THAT YOUR FOOD WOULD RUN OUT BEFORE YOU GOT MONEY TO BUY MORE.: NEVER TRUE

## 2025-08-22 SDOH — ECONOMIC STABILITY: FOOD INSECURITY: WITHIN THE PAST 12 MONTHS, THE FOOD YOU BOUGHT JUST DIDN'T LAST AND YOU DIDN'T HAVE MONEY TO GET MORE.: NEVER TRUE

## 2025-08-22 ASSESSMENT — ANXIETY QUESTIONNAIRES
IF YOU CHECKED OFF ANY PROBLEMS ON THIS QUESTIONNAIRE, HOW DIFFICULT HAVE THESE PROBLEMS MADE IT FOR YOU TO DO YOUR WORK, TAKE CARE OF THINGS AT HOME, OR GET ALONG WITH OTHER PEOPLE: NOT DIFFICULT AT ALL
2. NOT BEING ABLE TO STOP OR CONTROL WORRYING: NOT AT ALL
GAD7 TOTAL SCORE: 0
5. BEING SO RESTLESS THAT IT IS HARD TO SIT STILL: NOT AT ALL
7. FEELING AFRAID AS IF SOMETHING AWFUL MIGHT HAPPEN: NOT AT ALL
3. WORRYING TOO MUCH ABOUT DIFFERENT THINGS: NOT AT ALL
4. TROUBLE RELAXING: NOT AT ALL
6. BECOMING EASILY ANNOYED OR IRRITABLE: NOT AT ALL
1. FEELING NERVOUS, ANXIOUS, OR ON EDGE: NOT AT ALL

## 2025-08-22 ASSESSMENT — LIFESTYLE VARIABLES
AUDIT-C TOTAL SCORE: 0
SKIP TO QUESTIONS 9-10: 1
HOW OFTEN DO YOU HAVE SIX OR MORE DRINKS ON ONE OCCASION: NEVER
HOW OFTEN DO YOU HAVE A DRINK CONTAINING ALCOHOL: NEVER
HOW MANY STANDARD DRINKS CONTAINING ALCOHOL DO YOU HAVE ON A TYPICAL DAY: PATIENT DOES NOT DRINK

## 2025-08-22 ASSESSMENT — ENCOUNTER SYMPTOMS
DEPRESSION: 0
LOSS OF SENSATION IN FEET: 0
OCCASIONAL FEELINGS OF UNSTEADINESS: 0

## 2025-08-22 ASSESSMENT — PAIN SCALES - GENERAL: PAINLEVEL_OUTOF10: 0-NO PAIN

## 2025-08-25 ENCOUNTER — PHARMACY VISIT (OUTPATIENT)
Dept: PHARMACY | Facility: CLINIC | Age: 40
End: 2025-08-25
Payer: COMMERCIAL

## 2025-08-27 PROCEDURE — RXMED WILLOW AMBULATORY MEDICATION CHARGE

## 2025-08-28 ENCOUNTER — PHARMACY VISIT (OUTPATIENT)
Dept: PHARMACY | Facility: CLINIC | Age: 40
End: 2025-08-28
Payer: COMMERCIAL

## 2025-09-03 DIAGNOSIS — F32.A DEPRESSIVE DISORDER: ICD-10-CM

## 2025-09-03 PROCEDURE — RXMED WILLOW AMBULATORY MEDICATION CHARGE

## 2025-09-03 RX ORDER — DULOXETIN HYDROCHLORIDE 60 MG/1
60 CAPSULE, DELAYED RELEASE ORAL DAILY
Qty: 90 CAPSULE | Refills: 1 | Status: SHIPPED | OUTPATIENT
Start: 2025-09-03

## 2025-09-05 ENCOUNTER — PHARMACY VISIT (OUTPATIENT)
Dept: PHARMACY | Facility: CLINIC | Age: 40
End: 2025-09-05
Payer: COMMERCIAL

## 2025-09-15 ENCOUNTER — APPOINTMENT (OUTPATIENT)
Dept: PHARMACY | Facility: HOSPITAL | Age: 40
End: 2025-09-15
Payer: COMMERCIAL

## 2026-02-03 ENCOUNTER — APPOINTMENT (OUTPATIENT)
Dept: PRIMARY CARE | Facility: CLINIC | Age: 41
End: 2026-02-03
Payer: COMMERCIAL

## (undated) DEVICE — BLADE, SMARTRELEASE, ONYX, CARPAL TUNNEL, DISP

## (undated) DEVICE — DRESSING, GAUZE, PETROLATUM, STRIP, XEROFORM, 1 X 8 IN, STERILE

## (undated) DEVICE — SOLUTION, IRRIGATION, SODIUM CHLORIDE 0.9%, 1000 ML, POUR BOTTLE

## (undated) DEVICE — GLOVE, PROTEXIS PI CLASSIC, SZ-8.0, PF, PF, LF

## (undated) DEVICE — BANDAGE, ELASTIC, PREMIUM, SELF-CLOSE, 2 IN X 5 YD, STERILE

## (undated) DEVICE — TOWEL PACK, STERILE, 4/PACK, BLUE

## (undated) DEVICE — CUFF, TOURNIQUET, 18 X 4, DUAL PORT/SNGL BLADDER, DISP, LF

## (undated) DEVICE — SYRINGE, 10 CC, LUER LOCK

## (undated) DEVICE — APPLICATOR, CHLORAPREP, W/ORANGE TINT, 26ML

## (undated) DEVICE — SUTURE, ETHILON, 4-0, BLK, MONO, PS-2 18

## (undated) DEVICE — PADDING, CAST, SOFT, 2 X 4YDS

## (undated) DEVICE — COVER HANDLE LIGHT, STERIS, BLUE, STERILE